# Patient Record
Sex: MALE | Race: BLACK OR AFRICAN AMERICAN | Employment: FULL TIME | URBAN - METROPOLITAN AREA
[De-identification: names, ages, dates, MRNs, and addresses within clinical notes are randomized per-mention and may not be internally consistent; named-entity substitution may affect disease eponyms.]

---

## 2017-03-20 PROBLEM — J30.89 ENVIRONMENTAL AND SEASONAL ALLERGIES: Status: ACTIVE | Noted: 2017-03-20

## 2017-04-03 PROBLEM — R22.0 SWELLING OF LEFT SIDE OF FACE: Status: ACTIVE | Noted: 2017-04-03

## 2017-04-07 ENCOUNTER — HOSPITAL ENCOUNTER (OUTPATIENT)
Dept: ULTRASOUND IMAGING | Age: 47
Discharge: HOME | End: 2017-04-07
Attending: FAMILY MEDICINE
Payer: COMMERCIAL

## 2017-04-07 DIAGNOSIS — R60.9 SWELLING: ICD-10-CM

## 2017-04-07 PROCEDURE — 76536 US EXAM OF HEAD AND NECK: CPT

## 2017-04-18 PROBLEM — R94.5 ABNORMAL LIVER FUNCTION: Status: ACTIVE | Noted: 2017-04-18

## 2017-04-24 NOTE — PROGRESS NOTES
No subcutaneous masses demonstrated-this was discussed with the patient at his follow-up appointment

## 2017-04-26 PROBLEM — J34.1: Status: ACTIVE | Noted: 2017-04-26

## 2017-04-26 PROBLEM — J34.1 MAXILLARY SINUS CYST: Status: ACTIVE | Noted: 2017-04-26

## 2017-11-16 PROBLEM — E78.2 MIXED HYPERLIPIDEMIA: Status: ACTIVE | Noted: 2017-11-16

## 2017-11-16 PROBLEM — D45 POLYCYTHEMIA VERA (HCC): Status: RESOLVED | Noted: 2017-11-16 | Resolved: 2017-11-16

## 2017-11-16 PROBLEM — D45 POLYCYTHEMIA VERA (HCC): Status: ACTIVE | Noted: 2017-11-16

## 2018-05-15 PROBLEM — E66.01 SEVERE OBESITY (BMI 35.0-39.9) WITH COMORBIDITY (HCC): Status: RESOLVED | Noted: 2018-05-15 | Resolved: 2018-05-15

## 2018-05-15 PROBLEM — E66.01 SEVERE OBESITY (BMI 35.0-39.9) WITH COMORBIDITY (HCC): Status: ACTIVE | Noted: 2018-05-15

## 2018-06-19 PROBLEM — Z80.0 FAMILY HISTORY OF COLON CANCER: Status: ACTIVE | Noted: 2018-06-19

## 2018-06-19 PROBLEM — Z12.11 COLON CANCER SCREENING: Status: ACTIVE | Noted: 2018-06-19

## 2018-06-19 PROBLEM — Z12.5 PROSTATE CANCER SCREENING: Status: ACTIVE | Noted: 2018-06-19

## 2018-07-17 PROBLEM — E66.01 SEVERE OBESITY (BMI 35.0-39.9): Status: ACTIVE | Noted: 2018-07-17

## 2018-07-17 PROBLEM — Z83.71 FH: COLON POLYPS: Status: ACTIVE | Noted: 2018-07-17

## 2018-11-07 ENCOUNTER — ANESTHESIA EVENT (OUTPATIENT)
Dept: ENDOSCOPY | Age: 48
End: 2018-11-07
Payer: COMMERCIAL

## 2018-11-07 RX ORDER — SODIUM CHLORIDE 0.9 % (FLUSH) 0.9 %
5-10 SYRINGE (ML) INJECTION AS NEEDED
Status: CANCELLED | OUTPATIENT
Start: 2018-11-07

## 2018-11-07 RX ORDER — SODIUM CHLORIDE, SODIUM LACTATE, POTASSIUM CHLORIDE, CALCIUM CHLORIDE 600; 310; 30; 20 MG/100ML; MG/100ML; MG/100ML; MG/100ML
100 INJECTION, SOLUTION INTRAVENOUS CONTINUOUS
Status: CANCELLED | OUTPATIENT
Start: 2018-11-07

## 2018-11-07 NOTE — H&P
PETR [address] [facility], [address] [facility], [facility] [address] [facility] 
[phone] H&P Note Pieter Tyler   MRN: [id number]     : 1970 HPI: Pieter Tyler is a 51 y.o. male who is referred by Dr. Osiel Snow for initial colonoscopy. The patient is of above average risk for colon cancer. Patient has a positive family history of colon cancer in his paternal grandfather who was diagnosed in his [de-identified]. He has uncles on both sides of the family that have had colon polyps. He has several brothers that have colon polyps since their 45s. He believes that his father also has had colon polyps. He has no GI complaints. He has normal daily to 2 times a day bowel movements. He denies any blood, mucus or change in caliber of stools. He denies any prior abdominal or perianal surgeries. He has never had any therapeutic radiation. Past Medical History:  
Diagnosis Date  Dyslipidemia 2014  Family history of prostate cancer 2014  Fracture of right wrist 1979  
 HTN (hypertension) 2014  CLAIRE (obstructive sleep apnea) 2014 No past surgical history on file. No current facility-administered medications for this encounter. Current Outpatient Medications Medication Sig  
 naproxen (NAPROSYN) 500 mg tablet Take 1 Tab by mouth two (2) times daily (with meals).  predniSONE (DELTASONE) 20 mg tablet Take 1 Tab by mouth daily (with breakfast).  atorvastatin (LIPITOR) 10 mg tablet Take 1 Tab by mouth daily.  benazepril (LOTENSIN) 20 mg tablet TAKE 1 TAB BY MOUTH DAILY.  fluticasone (FLONASE) 50 mcg/actuation nasal spray 2 Sprays by Both Nostrils route daily.  fexofenadine (ALLEGRA) 180 mg tablet Take 1 Tab by mouth nightly.  EPINEPHrine (EPIPEN) 0.3 mg/0.3 mL injection 0.3 mL by IntraMUSCular route once as needed for up to 1 dose. ALLERGIES:  Patient has no known allergies. Social History Socioeconomic History  Marital status: SINGLE Spouse name: Not on file  Number of children: 0  
 Years of education: Not on file  Highest education level: Not on file Social Needs  Financial resource strain: Not on file  Food insecurity - worry: Not on file  Food insecurity - inability: Not on file  Transportation needs - medical: Not on file  Transportation needs - non-medical: Not on file Occupational History  Occupation:  Tobacco Use  Smoking status: Never Smoker  Smokeless tobacco: Never Used Substance and Sexual Activity  Alcohol use: No  
 Drug use: No  
 Sexual activity: Not Currently Partners: Female Other Topics Concern  Not on file Social History Narrative  Not on file Social History Tobacco Use Smoking Status Never Smoker Smokeless Tobacco Never Used Family History Problem Relation Age of Onset  Cancer Maternal Uncle 72  
     prostate cancer  Cancer Paternal Grandfather   
     colon cancer  Diabetes Other  Hypertension Other  Prostate Cancer Paternal Uncle   
     in 2 uncles ROS: The patient has no difficulty with chest pain or shortness of breath. No fever or chills. The patient denies any personal or family history of abnormal clotting or bleeding. Comprehensive review of systems was otherwise unremarkable except as noted above. Physical Exam:  
Constitutional: Alert oriented cooperative patient in no acute distress. Visit Vitals  /82 (BP 1 Location: Right arm, BP Patient Position: Sitting)  Pulse 70  Resp 19  
 Ht 5' 8\" (1.727 m)  Wt 109.8 kg (242 lb 1.6 oz)  SpO2 97%  BMI 36.81 kg/m2 Eyes:Sclera are clear without icterus. ENMT: no obvious neck masses, no ear or lip lesions CV: RRR. Normal perfusion Resp: No JVD. Breathing is  non-labored. GI: obese, soft and non-distended Musculoskeletal: unremarkable with normal function. Neuro:  No obvious focal deficits Psychiatric: normal affect and mood, no memory impairment Lab Results Component Value Date/Time WBC 4.0 05/10/2017 08:27 AM  
 HGB 15.0 05/10/2017 08:27 AM  
 HCT 44.0 05/10/2017 08:27 AM  
 PLATELET 241 98/56/2434 08:27 AM  
 MCV 89 05/10/2017 08:27 AM  
 
 
Lab Results Component Value Date/Time Sodium 148 (H) 05/15/2018 08:43 AM  
 Potassium 4.1 05/15/2018 08:43 AM  
 Chloride 106 05/15/2018 08:43 AM  
 CO2 20 05/15/2018 08:43 AM  
 BUN 7 05/15/2018 08:43 AM  
 Creatinine 1.35 (H) 05/15/2018 08:43 AM  
 Glucose 110 (H) 05/15/2018 08:43 AM  
 Bilirubin, total 0.7 05/15/2018 08:43 AM  
 AST (SGOT) 37 05/15/2018 08:43 AM  
 Alk. phosphatase 57 05/15/2018 08:43 AM  
 
 
Assessment/Plan:     Pool Hsu is a 51 y.o. male who has family history of colon cancer and colon polyps. He has not had his initial colonoscopy yet. He has no current GI symptoms. We discussed proceeding with colonoscopy. He should do well with a 1 day prep. He should do well with the adult colonoscope. I discussed the patient's condition and treatment options with the patient. I discussed risks of colonoscopy in language the patient could understand including bleeding, infection, aspiration, perforation, medication reaction, need for further endoscopy or surgery, abscess, fistula, SBO, DVT, PE, heart attack, stroke, renal failure, respiratory failure, ventilatory dependence, and death. The patient voiced understanding of all this and all questions were answered. Alternatives to colonoscopy were discussed also and risks of the alternatives. The patient requested that we proceed with colonoscopy. Informed consent was obtained. Problem List  Date Reviewed: 9/5/2018 Codes Class Noted Severe obesity (BMI 35.0-39. 9) ICD-10-CM: E66.01 
ICD-9-CM: 278.01  7/17/2018 FH: colon polyps ICD-10-CM: Z83.71 ICD-9-CM: V18.51  7/17/2018 Prostate cancer screening ICD-10-CM: Z12.5 ICD-9-CM: V76.44  6/19/2018 Colon cancer screening ICD-10-CM: Z12.11 ICD-9-CM: V76.51  6/19/2018 Family history of colon cancer ICD-10-CM: Z80.0 ICD-9-CM: V16.0  6/19/2018 Mixed hyperlipidemia ICD-10-CM: E78.2 ICD-9-CM: 272.2  11/16/2017 BMI 36.0-36.9,adult ICD-10-CM: Q83.46 
ICD-9-CM: V85.36  5/24/2017 Maxillary sinus cyst ICD-10-CM: J34.1 ICD-9-CM: 478.19  4/26/2017 Cyst of left sphenoid sinus ICD-10-CM: J34.1 ICD-9-CM: 478.19  4/26/2017 Abnormal liver function ICD-10-CM: K76.89 
ICD-9-CM: 573.9  4/18/2017 Swelling of left side of face ICD-10-CM: R22.0 ICD-9-CM: 784.2  4/3/2017 Environmental and seasonal allergies ICD-10-CM: J30.89 ICD-9-CM: 477.8  3/20/2017 BMI 35.0-35.9,adult ICD-10-CM: D27.00 ICD-9-CM: V85.35  12/30/2016 Physical exam, annual ICD-10-CM: Z00.00 ICD-9-CM: V70.0  12/30/2016 Chronic kidney disease ICD-10-CM: N18.9 ICD-9-CM: 585.9  12/30/2016 Blurry vision, right eye ICD-10-CM: H53.8 ICD-9-CM: 368.8  12/30/2016 Obesity (BMI 30-39. 9) ICD-10-CM: E66.9 ICD-9-CM: 278.00  11/10/2016 BMI 34.0-34.9,adult ICD-10-CM: U37.51 
ICD-9-CM: V85.34  11/10/2016 Hypertension, essential, benign ICD-10-CM: I10 
ICD-9-CM: 401.1  7/28/2014 CLAIRE (obstructive sleep apnea) ICD-10-CM: G47.33 
ICD-9-CM: 327.23  5/8/2014 Family history of prostate cancer ICD-10-CM: Z80.42 
ICD-9-CM: V16.42  5/8/2014 Dyslipidemia ICD-10-CM: E78.5 ICD-9-CM: 272.4  5/8/2014 Amaya Palomo MD,  FACS

## 2018-11-08 ENCOUNTER — HOSPITAL ENCOUNTER (OUTPATIENT)
Age: 48
Setting detail: OUTPATIENT SURGERY
Discharge: HOME | End: 2018-11-08
Attending: SURGERY | Admitting: SURGERY
Payer: COMMERCIAL

## 2018-11-08 ENCOUNTER — ANESTHESIA (OUTPATIENT)
Dept: ENDOSCOPY | Age: 48
End: 2018-11-08
Payer: COMMERCIAL

## 2018-11-08 VITALS
WEIGHT: 240 LBS | RESPIRATION RATE: 20 BRPM | HEART RATE: 75 BPM | SYSTOLIC BLOOD PRESSURE: 143 MMHG | DIASTOLIC BLOOD PRESSURE: 80 MMHG | OXYGEN SATURATION: 99 % | BODY MASS INDEX: 36.37 KG/M2 | HEIGHT: 68 IN

## 2018-11-08 PROCEDURE — 88305 TISSUE EXAM BY PATHOLOGIST: CPT

## 2018-11-08 PROCEDURE — 74011250636 HC RX REV CODE- 250/636: Performed by: ANESTHESIOLOGY

## 2018-11-08 PROCEDURE — 76040000026: Performed by: SURGERY

## 2018-11-08 PROCEDURE — 74011250636 HC RX REV CODE- 250/636

## 2018-11-08 PROCEDURE — 76060000032 HC ANESTHESIA 0.5 TO 1 HR: Performed by: SURGERY

## 2018-11-08 PROCEDURE — 77030013991 HC SNR POLYP ENDOSC BSC -A: Performed by: SURGERY

## 2018-11-08 RX ORDER — PROPOFOL 10 MG/ML
INJECTION, EMULSION INTRAVENOUS AS NEEDED
Status: DISCONTINUED | OUTPATIENT
Start: 2018-11-08 | End: 2018-11-08 | Stop reason: HOSPADM

## 2018-11-08 RX ORDER — PROPOFOL 10 MG/ML
INJECTION, EMULSION INTRAVENOUS
Status: DISCONTINUED | OUTPATIENT
Start: 2018-11-08 | End: 2018-11-08 | Stop reason: HOSPADM

## 2018-11-08 RX ORDER — SODIUM CHLORIDE, SODIUM LACTATE, POTASSIUM CHLORIDE, CALCIUM CHLORIDE 600; 310; 30; 20 MG/100ML; MG/100ML; MG/100ML; MG/100ML
100 INJECTION, SOLUTION INTRAVENOUS CONTINUOUS
Status: DISCONTINUED | OUTPATIENT
Start: 2018-11-08 | End: 2018-11-08 | Stop reason: HOSPADM

## 2018-11-08 RX ORDER — LIDOCAINE HYDROCHLORIDE 20 MG/ML
INJECTION, SOLUTION EPIDURAL; INFILTRATION; INTRACAUDAL; PERINEURAL AS NEEDED
Status: DISCONTINUED | OUTPATIENT
Start: 2018-11-08 | End: 2018-11-08 | Stop reason: HOSPADM

## 2018-11-08 RX ADMIN — SODIUM CHLORIDE, SODIUM LACTATE, POTASSIUM CHLORIDE, AND CALCIUM CHLORIDE 100 ML/HR: 600; 310; 30; 20 INJECTION, SOLUTION INTRAVENOUS at 10:15

## 2018-11-08 RX ADMIN — PROPOFOL 200 MCG/KG/MIN: 10 INJECTION, EMULSION INTRAVENOUS at 10:39

## 2018-11-08 RX ADMIN — LIDOCAINE HYDROCHLORIDE 40 MG: 20 INJECTION, SOLUTION EPIDURAL; INFILTRATION; INTRACAUDAL; PERINEURAL at 10:39

## 2018-11-08 RX ADMIN — PROPOFOL 50 MG: 10 INJECTION, EMULSION INTRAVENOUS at 10:39

## 2018-11-08 NOTE — INTERVAL H&P NOTE
H&P Update: 
Windy Foot was seen and examined. History and physical has been reviewed. The patient has been examined.  There have been no significant clinical changes since the completion of the originally dated History and Physical. 
 
Signed By: Briseida Baires MD   
 November 8, 2018 10:27 AM

## 2018-11-08 NOTE — ANESTHESIA POSTPROCEDURE EVALUATION
Procedure(s): 
COLONOSCOPY / BMI=36 ENDOSCOPIC POLYPECTOMY. Anesthesia Post Evaluation Multimodal analgesia: multimodal analgesia used between 6 hours prior to anesthesia start to PACU discharge Patient location during evaluation: PACU Patient participation: complete - patient participated Level of consciousness: responsive to verbal stimuli Pain management: adequate Airway patency: patent Anesthetic complications: no 
Cardiovascular status: acceptable Respiratory status: spontaneous ventilation and nonlabored ventilation Hydration status: acceptable Comments: No Nausea Visit Vitals /76 Pulse 86 Resp 20 Ht 5' 8\" (1.727 m) Wt 108.9 kg (240 lb) SpO2 97% BMI 36.49 kg/m²

## 2018-11-08 NOTE — ANESTHESIA PREPROCEDURE EVALUATION
Anesthetic History No history of anesthetic complications Review of Systems / Medical History Patient summary reviewed, nursing notes reviewed and pertinent labs reviewed Pulmonary Sleep apnea: CPAP Neuro/Psych Within defined limits Cardiovascular Hypertension Hyperlipidemia Exercise tolerance: >4 METS 
  
GI/Hepatic/Renal 
Within defined limits Endo/Other Obesity Other Findings Physical Exam 
 
Airway Mallampati: II 
 
 
Mouth opening: Normal 
 
 Cardiovascular Regular rate and rhythm,  S1 and S2 normal,  no murmur, click, rub, or gallop Dental 
No notable dental hx Pulmonary Breath sounds clear to auscultation Abdominal 
 
 
 
 Other Findings Anesthetic Plan ASA: 2 Anesthesia type: total IV anesthesia Induction: Intravenous Anesthetic plan and risks discussed with: Patient Discussed TIVA with its benefits (lower risk of nausea and sore throat, etc.) and risks including possible awareness, patient understands and elects to proceed

## 2018-11-08 NOTE — DISCHARGE INSTRUCTIONS
Gastrointestinal Colonoscopy/Flexible Sigmoidoscopy - Lower Exam Discharge Instructions  1. Call Dr. Melissa Downing at [phone] for any problems or questions. 2. Contact the doctors office for follow up appointment as directed  3. Medication may cause drowsiness for several hours, therefore, do not drive or operate machinery for remainder of the day. 4. No alcohol today. 5. Ordinarily, you may resume regular diet and activity after exam unless otherwise specified by your physician. 6. Because of air put into your colon during exam, you may experience some abdominal distension, relieved by the passage of gas, for several hours. 7. Contact your physician if you have any of the following:  a. Excessive amount of bleeding - large amount when having a bowel movement. Occasional specks of blood with bowel movement would not be unusual.  b. Severe abdominal pain  c. Fever or Chills  8. Polyp Removal - follow these additional instructions  a. No Aspirin, Advil, Aleve, Nuprin, Ibuprofen, or medications that contain these drugs for 2 weeks. Any additional instructions:   Follow up with pathology results, follow with doctor in two weeks, need two day prep next colonoscopy, recommend to repeat colonoscopy in one year,

## 2018-11-12 NOTE — PROCEDURES
[address] [facility]  [phone]    Colonoscopy Procedure Note    Name: Olita Bloch     Date: 11/8/2018  Med Record Number: [id number]   Age: 51 y.o. Sex: male   Procedure: Colonoscopy with polypectomy (cold snare), polypectomy (snare cautery)  Pre-operative Diagnosis:  Increased risk colon screen, +FH colon cancer, +FH colon polyps  Post-operative Diagnosis: colon polyps x 3 (cecum, ascending colon, hepatic flexure), incomplete prep (splenic flexure and descending colon 30% not well visualized)  Indications: family history of colon cancer and polyps, screening for colon cancer  Anesthesia/Sedation: MAC IV MAC anesthesia Propofol  Procedure Details:    Informed consent was obtained for the procedure, including sedation. Risks of perforation, hemorrhage, adverse drug reaction and aspiration were discussed. The patient was placed in the left lateral decubitus position. Based on the pre-procedure assessment, including review of the patient's medical history, medications, allergies, and review of systems, he had been deemed to be an appropriate candidate for sedation by the Anesthesia Dept. The patient was monitored continuously with ECG tracing, pulse oximetry, blood pressure monitoring, and direct observations. A time out was performed. Once sedation was adequate, a rectal examination was performed. The [id number] was inserted into the rectum and advanced under direct vision to the cecum, which was identified by the ileocecal valve and appendiceal orifice. The quality of the colonic preparation was suboptimal with dependent side of the splenic flexure and descending colon with 30% of the lumen not visualized due to vegetative retained stool. .  A careful inspection was made as the colonoscope was withdrawn, including a retroflexed view of the rectum; findings and interventions are described below.   Appropriate photodocumentation was obtained. Findings: ANUS: Anal exam reveals no masses or hemorrhoids, sphincter tone is normal.   RECTUM: Rectal exam reveals no masses or hemorrhoids. SIGMOID COLON: The mucosa is normal with good vascular pattern and without ulcers, diverticula, and polyps. DESCENDING COLON: The mucosa is normal with good vascular pattern and without ulcers, diverticula, and polyps. 30% of the proximal dependent lumen was not visualized due to retained vegetative fecal material  SPLENIC FLEXURE: The splenic flexure is normal.  30% of the dependent lumen was not visualized due to retained vegetative fecal material  TRANSVERSE COLON: The mucosa is normal with good vascular pattern and without ulcers, diverticula, and polyps. HEPATIC FLEXURE: The hepatic flexure shows:  - Protruding lesions:  -Pedunculated Polyp size Patient~10 mm removed by polypectomy (snare cautery)  ASCENDING COLON: The mucosa is normal with good vascular pattern and without ulcers and diverticula. - Protruding lesions: -Pedunculated Polyp(distal R colon) size Patient~10 mm removed by polypectomy (cold snare)   CECUM: The appendiceal orifice appears normal. The ileocecal valve appears normal.   - Protruding lesions: -Pedunculated Polyp size 5 mm removed by polypectomy (snare cautery)  TERMINAL ILEUM: The terminal ileum was not entered. Specimens: cecal polyp, R colon polyp, hepatic flexure polyp    Estimated Blood Loss:  0-minimum           Complications: None; patient tolerated the procedure well. Attending Attestation: I performed the procedure. Impression:  See post-procedure diagnoses    Recommendations:-Await pathology. , -Follow up with me., -Post polypectomy precautions. Repeat colonoscopy in 6-12 months pending pathology review. Discussed status of incomplete prep and recommend 2-day prep at next colonoscopy.     Hector Tracey MD, FACS

## 2018-11-19 PROBLEM — T63.441A BEE STING REACTION: Status: ACTIVE | Noted: 2018-11-19

## 2018-12-07 PROBLEM — D12.6 ADENOMATOUS POLYP OF COLON: Status: ACTIVE | Noted: 2018-12-07

## 2018-12-07 PROBLEM — D12.2 ADENOMATOUS POLYP OF ASCENDING COLON: Status: ACTIVE | Noted: 2018-12-07

## 2019-05-01 ENCOUNTER — ANESTHESIA EVENT (OUTPATIENT)
Dept: ENDOSCOPY | Age: 49
End: 2019-05-01
Payer: COMMERCIAL

## 2019-05-01 NOTE — H&P
PETR [facility]  
[address] [facility], [address] [facility], [address] [facility] 
[phone] H&P Note Aicha Sims   MRN: [id number]     : 1970 HPI: Aicha Sims is a 47 y.o. male who returns to the office following his initial screening colonoscopy. He was high risk screening due to family history. He had significant findings. He had 3 significant polyps removed from the ascending colon. One was in the cecum, one was in the distal ascending colon and one was in the hepatic flexure. All 3 of these polyps were adenomas. Greater than 10 mm polyp in the distal ascending colon was a tubulovillous adenoma. The other 2 polyps were tubular adenomas. Patient completed a 1 day prep as directed. Unfortunately this did not completely clear out his colon for complete visualization of the left side of his colon. From the splenic flexure to the mid descending colon,  30% about lumen cannot be cleared. Given his significant histology, I am recommending repeat colonoscopy in 6 months. We can have a short interval follow-up of the polyps that were removed as well as to clear the remaining portion of the unseen colon. We will recommend a 2-day prep. He otherwise tolerated the colonoscopy procedure well. I gave him copy of his pathology report and explained the findings. He was referred by Dr. Paulina Reid for initial colonoscopy. The patient is of above average risk for colon cancer. Patient has a positive family history of colon cancer in his paternal grandfather who was diagnosed in his [de-identified]. He has uncles on both sides of the family that have had colon polyps. He has several brothers that have colon polyps since their 45s. He believes that his father also has had colon polyps. He has no GI complaints. He has normal daily to 2 times a day bowel movements. He denies any blood, mucus or change in caliber of stools.   He denies any prior abdominal or perianal surgeries. He has never had any therapeutic radiation. Past Medical History:  
Diagnosis Date  Dyslipidemia 5/8/2014  Family history of prostate cancer 5/8/2014  Fracture of right wrist 1979  
 HTN (hypertension) 5/8/2014  CLAIRE (obstructive sleep apnea) 5/8/2014  
 cpap Past Surgical History:  
Procedure Laterality Date  COLONOSCOPY N/A 11/8/2018 COLONOSCOPY / BMI=36 performed by Madonna Greer MD at [facility] ENDOSCOPY  COLONOSCOPY,ELOISE MEADOWS,SNARE  11/12/2018 No current facility-administered medications for this encounter. Current Outpatient Medications Medication Sig  
 atorvastatin (LIPITOR) 10 mg tablet Take 1 Tab by mouth daily. (Patient taking differently: Take 10 mg by mouth nightly.)  benazepril (LOTENSIN) 20 mg tablet TAKE 1 TAB BY MOUTH DAILY. ALLERGIES:  Bee sting [sting, bee] Social History Socioeconomic History  Marital status: SINGLE Spouse name: Not on file  Number of children: 0  
 Years of education: Not on file  Highest education level: Not on file Occupational History  Occupation:  Tobacco Use  Smoking status: Never Smoker  Smokeless tobacco: Never Used Substance and Sexual Activity  Alcohol use: No  
 Drug use: No  
 Sexual activity: Not Currently Partners: Female Social History Tobacco Use Smoking Status Never Smoker Smokeless Tobacco Never Used Family History Problem Relation Age of Onset  Cancer Maternal Uncle 72  
     prostate cancer  Cancer Paternal Grandfather   
     colon cancer  Diabetes Other  Hypertension Other  Prostate Cancer Paternal Uncle   
     in 2 uncles  Hypertension Mother  Diabetes Father  Diabetes Sister  Hypertension Brother  Hypertension Sister ROS: The patient has no difficulty with chest pain or shortness of breath. No fever or chills. The patient denies any personal or family history of abnormal clotting or bleeding. Comprehensive review of systems was otherwise unremarkable except as noted above. Physical Exam:  
Constitutional: Alert oriented cooperative patient in no acute distress. Visit Vitals /80 (BP 1 Location: Right arm, BP Patient Position: Sitting) Pulse (!) 101 Resp 19 Ht 5' 8\" (1.727 m) Wt 248 lb 14.4 oz (112.9 kg) SpO2 97% BMI 37.85 kg/m² Eyes:Sclera are clear without icterus. ENMT: no obvious neck masses, no ear or lip lesions CV: RRR. Normal perfusion Resp: No JVD. Breathing is  non-labored. GI: obese, soft and non-distended Musculoskeletal: unremarkable with normal function. Neuro:  No obvious focal deficits Psychiatric: normal affect and mood, no memory impairment Lab Results Component Value Date/Time WBC 4.3 11/19/2018 08:47 AM  
 HGB 16.4 11/19/2018 08:47 AM  
 HCT 48.7 11/19/2018 08:47 AM  
 PLATELET 618 20/76/4171 08:47 AM  
 MCV 86 11/19/2018 08:47 AM  
 
 
Lab Results Component Value Date/Time Sodium 145 (H) 11/19/2018 08:47 AM  
 Potassium 3.6 11/19/2018 08:47 AM  
 Chloride 105 11/19/2018 08:47 AM  
 CO2 24 11/19/2018 08:47 AM  
 BUN 7 11/19/2018 08:47 AM  
 Creatinine 1.31 (H) 11/19/2018 08:47 AM  
 Glucose 102 (H) 11/19/2018 08:47 AM  
 Bilirubin, total 0.5 11/19/2018 08:47 AM  
 AST (SGOT) 46 (H) 11/19/2018 08:47 AM  
 Alk. phosphatase 59 11/19/2018 08:47 AM  
 
 
Assessment/Plan:     Eliu Figueroa is a 47 y.o. male who has significant findings on his initial colonoscopy. He had 3 adenomatous polyps on the right colon. He had incomplete visualization of the left colon and will require 2-day prep for next colonoscopy. We discussed short interval colonoscopy in 6 months. . We fashioned a 2-day prep  He should do well with the adult colonoscope.   I discussed the patient's condition and treatment options with the patient. I discussed risks of colonoscopy in language the patient could understand including bleeding, infection, aspiration, perforation, medication reaction, need for further endoscopy or surgery, abscess, fistula, SBO, DVT, PE, heart attack, stroke, renal failure, respiratory failure, ventilatory dependence, and death. The patient voiced understanding of all this and all questions were answered. Alternatives to colonoscopy were discussed also and risks of the alternatives. The patient requested that we proceed with colonoscopy. Informed consent was obtained. He understands that if his colonoscopy is negative at this next session then we will recommend an interval surveillance of 3 years. Problem List  Date Reviewed: 12/7/2018 Codes Class Noted Adenomatous polyp of ascending colon ICD-10-CM: D12.2 ICD-9-CM: 211.3  12/7/2018 Overview Signed 12/7/2018  8:47 AM by Koby Flanagan MD  
  Initial colonoscopy 11/8/2018 DIAGNOSIS  
A: CECAL POLYP: FRAGMENTS OF TUBULAR ADENOMA. B: ASCENDING COLON POLYP: FRAGMENTS OF MIXED TUBULOVILLOUS ADENOMA. C: HEPATIC FLEXURE POLYP: FRAGMENT OF TUBULAR ADENOMA. Electronically signed out on 11/9/2018 10:16 by ROGER Landis M.D. Bee sting reaction ICD-10-CM: K55.051K ICD-9-CM: 989.5, E905.3  11/19/2018 Severe obesity (BMI 35.0-39. 9) ICD-10-CM: E66.01 
ICD-9-CM: 278.01  7/17/2018 FH: colon polyps ICD-10-CM: Z83.71 ICD-9-CM: V18.51  7/17/2018 Prostate cancer screening ICD-10-CM: Z12.5 ICD-9-CM: V76.44  6/19/2018 Colon cancer screening ICD-10-CM: Z12.11 ICD-9-CM: V76.51  6/19/2018 Family history of colon cancer ICD-10-CM: Z80.0 ICD-9-CM: V16.0  6/19/2018 Mixed hyperlipidemia ICD-10-CM: E78.2 ICD-9-CM: 272.2  11/16/2017 BMI 36.0-36.9,adult ICD-10-CM: X83.05 
ICD-9-CM: V85.36  5/24/2017 Maxillary sinus cyst ICD-10-CM: J34.1 ICD-9-CM: 478.19  4/26/2017 Cyst of left sphenoid sinus ICD-10-CM: J34.1 ICD-9-CM: 478.19  4/26/2017 Abnormal liver function ICD-10-CM: R94.5 ICD-9-CM: 794.8  4/18/2017 Swelling of left side of face ICD-10-CM: R22.0 ICD-9-CM: 784.2  4/3/2017 Environmental and seasonal allergies ICD-10-CM: J30.89 ICD-9-CM: 477.8  3/20/2017 BMI 35.0-35.9,adult ICD-10-CM: V69.26 ICD-9-CM: V85.35  12/30/2016 Physical exam, annual ICD-10-CM: Z00.00 ICD-9-CM: V70.0  12/30/2016 Chronic kidney disease ICD-10-CM: N18.9 ICD-9-CM: 585.9  12/30/2016 Blurry vision, right eye ICD-10-CM: H53.8 ICD-9-CM: 368.8  12/30/2016 Obesity (BMI 30-39. 9) ICD-10-CM: E66.9 ICD-9-CM: 278.00  11/10/2016 BMI 34.0-34.9,adult ICD-10-CM: I61.21 
ICD-9-CM: V85.34  11/10/2016 Hypertension, essential, benign ICD-10-CM: I10 
ICD-9-CM: 401.1  7/28/2014 CLAIRE (obstructive sleep apnea) ICD-10-CM: G47.33 
ICD-9-CM: 327.23  5/8/2014 Family history of prostate cancer ICD-10-CM: Z80.42 
ICD-9-CM: V16.42  5/8/2014 Dyslipidemia ICD-10-CM: E78.5 ICD-9-CM: 272.4  5/8/2014 Christine Montilla MD,  FACS

## 2019-05-02 ENCOUNTER — HOSPITAL ENCOUNTER (OUTPATIENT)
Age: 49
Setting detail: OUTPATIENT SURGERY
Discharge: HOME | End: 2019-05-02
Attending: SURGERY | Admitting: SURGERY
Payer: COMMERCIAL

## 2019-05-02 ENCOUNTER — ANESTHESIA (OUTPATIENT)
Dept: ENDOSCOPY | Age: 49
End: 2019-05-02
Payer: COMMERCIAL

## 2019-05-02 VITALS
DIASTOLIC BLOOD PRESSURE: 86 MMHG | HEIGHT: 68 IN | WEIGHT: 235 LBS | SYSTOLIC BLOOD PRESSURE: 125 MMHG | OXYGEN SATURATION: 97 % | RESPIRATION RATE: 16 BRPM | HEART RATE: 74 BPM | BODY MASS INDEX: 35.61 KG/M2 | TEMPERATURE: 96.8 F

## 2019-05-02 PROCEDURE — 76060000032 HC ANESTHESIA 0.5 TO 1 HR: Performed by: SURGERY

## 2019-05-02 PROCEDURE — 74011000250 HC RX REV CODE- 250

## 2019-05-02 PROCEDURE — 88305 TISSUE EXAM BY PATHOLOGIST: CPT

## 2019-05-02 PROCEDURE — 77030013991 HC SNR POLYP ENDOSC BSC -A: Performed by: SURGERY

## 2019-05-02 PROCEDURE — 76040000026: Performed by: SURGERY

## 2019-05-02 PROCEDURE — 74011250636 HC RX REV CODE- 250/636

## 2019-05-02 PROCEDURE — 74011250636 HC RX REV CODE- 250/636: Performed by: ANESTHESIOLOGY

## 2019-05-02 RX ORDER — PROPOFOL 10 MG/ML
INJECTION, EMULSION INTRAVENOUS
Status: DISCONTINUED | OUTPATIENT
Start: 2019-05-02 | End: 2019-05-02 | Stop reason: HOSPADM

## 2019-05-02 RX ORDER — PROPOFOL 10 MG/ML
INJECTION, EMULSION INTRAVENOUS AS NEEDED
Status: DISCONTINUED | OUTPATIENT
Start: 2019-05-02 | End: 2019-05-02 | Stop reason: HOSPADM

## 2019-05-02 RX ORDER — EPHEDRINE SULFATE 50 MG/ML
INJECTION, SOLUTION INTRAVENOUS AS NEEDED
Status: DISCONTINUED | OUTPATIENT
Start: 2019-05-02 | End: 2019-05-02 | Stop reason: HOSPADM

## 2019-05-02 RX ORDER — SODIUM CHLORIDE, SODIUM LACTATE, POTASSIUM CHLORIDE, CALCIUM CHLORIDE 600; 310; 30; 20 MG/100ML; MG/100ML; MG/100ML; MG/100ML
100 INJECTION, SOLUTION INTRAVENOUS CONTINUOUS
Status: DISCONTINUED | OUTPATIENT
Start: 2019-05-02 | End: 2019-05-02 | Stop reason: HOSPADM

## 2019-05-02 RX ADMIN — EPHEDRINE SULFATE 5 MG: 50 INJECTION, SOLUTION INTRAVENOUS at 08:19

## 2019-05-02 RX ADMIN — PROPOFOL 20 MG: 10 INJECTION, EMULSION INTRAVENOUS at 08:07

## 2019-05-02 RX ADMIN — SODIUM CHLORIDE, SODIUM LACTATE, POTASSIUM CHLORIDE, AND CALCIUM CHLORIDE 100 ML/HR: 600; 310; 30; 20 INJECTION, SOLUTION INTRAVENOUS at 07:27

## 2019-05-02 RX ADMIN — PROPOFOL 30 MG: 10 INJECTION, EMULSION INTRAVENOUS at 08:08

## 2019-05-02 RX ADMIN — PROPOFOL 180 MCG/KG/MIN: 10 INJECTION, EMULSION INTRAVENOUS at 08:07

## 2019-05-02 RX ADMIN — SODIUM CHLORIDE, SODIUM LACTATE, POTASSIUM CHLORIDE, AND CALCIUM CHLORIDE 100 ML/HR: 600; 310; 30; 20 INJECTION, SOLUTION INTRAVENOUS at 07:48

## 2019-05-02 NOTE — PROCEDURES
[address] [facility]  [phone]    Colonoscopy Procedure Note    Name: Sabrina Freeman     Date: 5/2/2019  Med Record Number: [id number]   Age: 47 y.o. Sex: male   Procedure: Colonoscopy with polypectomy (cold snare), polypectomy (snare cautery)  Pre-operative Diagnosis:  H/o adenomatous polyps ascending colon w incomplete prep L colon (6 mos prior)  Post-operative Diagnosis: sigmoid colon polyp, mid rectal polyp, tortuous colon  Indications: previous adenomatous polyp, incomplete prep L side of colon  Anesthesia/Sedation: MAC IV MAC anesthesia Propofol  Procedure Details:    Informed consent was obtained for the procedure, including sedation. Risks of perforation, hemorrhage, adverse drug reaction and aspiration were discussed. The patient was placed in the left lateral decubitus position. Based on the pre-procedure assessment, including review of the patient's medical history, medications, allergies, and review of systems, he had been deemed to be an appropriate candidate for sedation by the Anesthesia Dept. The patient was monitored continuously with ECG tracing, pulse oximetry, blood pressure monitoring, and direct observations. A time out was performed. Once sedation was adequate, a rectal examination was performed. The [id number] was inserted into the rectum and advanced under direct vision to the cecum, which was identified by the ileocecal valve and appendiceal orifice. The quality of the 2 day colonic preparation was excellent. A careful inspection was made as the colonoscope was withdrawn, including a retroflexed view of the rectum; findings and interventions are described below. Appropriate photodocumentation was obtained.     Findings: ANUS: Anal exam reveals no masses or hemorrhoids, sphincter tone is normal.   RECTUM: Rectal exam reveals no masses or hemorrhoids.   - Protruding lesions:  -Pedunculated Polyp size 5 mm removed by polypectomy (snare cautery) located on proximal side of 2nd rectal valve. SIGMOID COLON: The mucosa is normal with good vascular pattern and without ulcers and diverticula. - Protruding lesions:  -Pedunculated Polyp size 3 mm removed by polypectomy (cold snare)  DESCENDING COLON: The mucosa is normal with good vascular pattern and without ulcers, diverticula, and polyps. SPLENIC FLEXURE: The splenic flexure is normal.   TRANSVERSE COLON: The mucosa is normal with good vascular pattern and without ulcers, diverticula, and polyps. HEPATIC FLEXURE: The hepatic flexure is normal.   ASCENDING COLON: The mucosa is normal with good vascular pattern and without ulcers, diverticula, and polyps. CECUM: The appendiceal orifice appears normal. The ileocecal valve appears normal.   TERMINAL ILEUM: The terminal ileum was not entered. Specimens: sigmoid polyp, rectal polyp    Estimated Blood Loss:  0-minimum           Complications: None; patient tolerated the procedure well. Attending Attestation: I performed the procedure. Impression:  See post-procedure diagnoses    Recommendations:-Await pathology. , -Repeat colonoscopy in 3 years (2022) based on initial colonoscopy with TV adenomas. , -Follow up with me., -post polypectomy precautions.     Christine Montilla MD, FACS

## 2019-05-02 NOTE — INTERVAL H&P NOTE
H&P Update: 
Diana Gutiérrez was seen and examined. History and physical has been reviewed. The patient has been examined.  There have been no significant clinical changes since the completion of the originally dated History and Physical.

## 2019-05-02 NOTE — ANESTHESIA PREPROCEDURE EVALUATION
Anesthetic History No history of anesthetic complications Review of Systems / Medical History Patient summary reviewed, nursing notes reviewed and pertinent labs reviewed Pulmonary Sleep apnea: CPAP Neuro/Psych Within defined limits Cardiovascular Hypertension: well controlled Hyperlipidemia Exercise tolerance: >4 METS 
  
GI/Hepatic/Renal 
  
 
 
Renal disease: CRI Endo/Other Obesity Other Findings Physical Exam 
 
Airway Mallampati: II 
TM Distance: 4 - 6 cm Neck ROM: normal range of motion Mouth opening: Normal 
 
 Cardiovascular Regular rate and rhythm,  S1 and S2 normal,  no murmur, click, rub, or gallop Dental 
No notable dental hx Pulmonary Breath sounds clear to auscultation Abdominal 
 
 
 
 Other Findings Anesthetic Plan ASA: 3 Anesthesia type: total IV anesthesia Induction: Intravenous Anesthetic plan and risks discussed with: Patient Discussed TIVA with its benefits (lower risk of nausea and sore throat, etc.) and risks including possible awareness, patient understands and elects to proceed

## 2019-05-02 NOTE — ROUTINE PROCESS
VSS at discharge. No complaints noted. Education reviewed and signed with patient and family. Pt discharged via wheelchair by Rocío Urban, [address] [place] [address]. Patient to be driven home by sister.

## 2019-05-02 NOTE — DISCHARGE INSTRUCTIONS
Dr. Jocelin Barrett  [phone]    Instructions following colonoscopy:    ACTIVITY:   Resume usual, basic activities around the house today.  You may be light-headed or sleepy from anesthesia, so be careful going up and down stairs.  Avoid driving, operating machinery, or signing documents for 24 hours. DIET:   No restriction. Please note, some people may have nausea or cramps after this procedure which can result in an upset stomach after eating.  Many people have loose stools or diarrhea immediately after colonoscopy. It is also not uncommon to not have a bowel movement for 2-3 days. PAIN:   Some cramping or gas pain is normal after colonoscopy. However, if you experience worsening pain over the course of the day, or pain with associated fever please call the office immediately      [address] IF:   You have a temperature higher than 101.5° Fahrenheit for more than 6 hours.  You have severe nausea or vomiting not relieved by medication; or diarrhea. Continue home medications as previously prescribed.     Call the office and make appointment for two weeks to discuss results

## 2019-05-02 NOTE — ANESTHESIA POSTPROCEDURE EVALUATION
Procedure(s): 
COLONOSCOPY/ 37 
ENDOSCOPIC POLYPECTOMY. total IV anesthesia Anesthesia Post Evaluation Multimodal analgesia: multimodal analgesia used between 6 hours prior to anesthesia start to PACU discharge Patient location during evaluation: bedside Patient participation: complete - patient participated Level of consciousness: awake and alert Pain score: 3 Pain management: adequate Airway patency: patent Anesthetic complications: no 
Cardiovascular status: acceptable and hemodynamically stable Respiratory status: acceptable Hydration status: acceptable Post anesthesia nausea and vomiting:  none Vitals Value Taken Time /70 5/2/2019  8:47 AM  
Temp 36 °C (96.8 °F) 5/2/2019  8:47 AM  
Pulse 86 5/2/2019  8:53 AM  
Resp 18 5/2/2019  8:47 AM  
SpO2 96 % 5/2/2019  8:53 AM  
Vitals shown include unvalidated device data.

## 2020-06-11 PROBLEM — Z23 ENCOUNTER FOR IMMUNIZATION: Status: ACTIVE | Noted: 2020-06-11

## 2020-06-11 PROBLEM — J30.1 ALLERGIC RHINITIS DUE TO POLLEN: Status: ACTIVE | Noted: 2020-06-11

## 2020-07-11 PROBLEM — Z23 ENCOUNTER FOR IMMUNIZATION: Status: RESOLVED | Noted: 2020-06-11 | Resolved: 2020-07-11

## 2021-01-10 PROBLEM — Z23 ENCOUNTER FOR IMMUNIZATION: Status: RESOLVED | Noted: 2020-06-11 | Resolved: 2021-01-10

## 2021-01-15 ENCOUNTER — HOSPITAL ENCOUNTER (OUTPATIENT)
Dept: MRI IMAGING | Age: 51
Discharge: HOME | End: 2021-01-15
Attending: UROLOGY
Payer: COMMERCIAL

## 2021-01-15 DIAGNOSIS — R97.20 ELEVATED PSA: ICD-10-CM

## 2021-01-15 PROCEDURE — 74011250636 HC RX REV CODE- 250/636: Performed by: UROLOGY

## 2021-01-15 PROCEDURE — A9575 INJ GADOTERATE MEGLUMI 0.1ML: HCPCS | Performed by: UROLOGY

## 2021-01-15 PROCEDURE — 72197 MRI PELVIS W/O & W/DYE: CPT

## 2021-01-15 RX ORDER — GADOTERATE MEGLUMINE 376.9 MG/ML
22 INJECTION INTRAVENOUS
Status: COMPLETED | OUTPATIENT
Start: 2021-01-15 | End: 2021-01-15

## 2021-01-15 RX ORDER — SODIUM CHLORIDE 0.9 % (FLUSH) 0.9 %
10 SYRINGE (ML) INJECTION
Status: COMPLETED | OUTPATIENT
Start: 2021-01-15 | End: 2021-01-15

## 2021-01-15 RX ADMIN — Medication 10 ML: at 17:30

## 2021-01-15 RX ADMIN — GADOTERATE MEGLUMINE 22 ML: 376.9 INJECTION INTRAVENOUS at 17:30

## 2022-02-11 ENCOUNTER — HOSPITAL ENCOUNTER (OUTPATIENT)
Dept: LAB | Age: 52
Discharge: HOME | End: 2022-02-11
Payer: COMMERCIAL

## 2022-02-11 DIAGNOSIS — R97.20 ELEVATED PSA: ICD-10-CM

## 2022-02-11 LAB — PSA SERPL-MCNC: 4.1 NG/ML

## 2022-02-11 PROCEDURE — 36415 COLL VENOUS BLD VENIPUNCTURE: CPT

## 2022-02-11 PROCEDURE — 84153 ASSAY OF PSA TOTAL: CPT

## 2022-03-18 PROBLEM — Z12.5 PROSTATE CANCER SCREENING: Status: ACTIVE | Noted: 2018-06-19

## 2022-03-18 PROBLEM — Z80.0 FAMILY HISTORY OF COLON CANCER: Status: ACTIVE | Noted: 2018-06-19

## 2022-03-18 PROBLEM — R22.0 SWELLING OF LEFT SIDE OF FACE: Status: ACTIVE | Noted: 2017-04-03

## 2022-03-19 PROBLEM — J34.1 MAXILLARY SINUS CYST: Status: ACTIVE | Noted: 2017-04-26

## 2022-03-19 PROBLEM — J30.1 ALLERGIC RHINITIS DUE TO POLLEN: Status: ACTIVE | Noted: 2020-06-11

## 2022-03-19 PROBLEM — J34.1: Status: ACTIVE | Noted: 2017-04-26

## 2022-03-19 PROBLEM — Z83.71 FH: COLON POLYPS: Status: ACTIVE | Noted: 2018-07-17

## 2022-03-19 PROBLEM — R94.5 ABNORMAL LIVER FUNCTION: Status: ACTIVE | Noted: 2017-04-18

## 2022-03-19 PROBLEM — E66.01 SEVERE OBESITY WITH BODY MASS INDEX (BMI) OF 35.0 TO 39.9 WITH SERIOUS COMORBIDITY (HCC): Status: ACTIVE | Noted: 2018-07-17

## 2022-03-19 PROBLEM — J30.89 ENVIRONMENTAL AND SEASONAL ALLERGIES: Status: ACTIVE | Noted: 2017-03-20

## 2022-03-19 PROBLEM — Z83.719 FH: COLON POLYPS: Status: ACTIVE | Noted: 2018-07-17

## 2022-03-19 PROBLEM — T63.441A BEE STING REACTION: Status: ACTIVE | Noted: 2018-11-19

## 2022-03-20 PROBLEM — E78.2 MIXED HYPERLIPIDEMIA: Status: ACTIVE | Noted: 2017-11-16

## 2022-03-20 PROBLEM — Z12.11 COLON CANCER SCREENING: Status: ACTIVE | Noted: 2018-06-19

## 2022-03-20 PROBLEM — D12.2 ADENOMATOUS POLYP OF ASCENDING COLON: Status: ACTIVE | Noted: 2018-12-07

## 2022-06-15 ENCOUNTER — OFFICE VISIT (OUTPATIENT)
Dept: FAMILY MEDICINE CLINIC | Facility: CLINIC | Age: 52
End: 2022-06-15
Payer: COMMERCIAL

## 2022-06-15 VITALS
TEMPERATURE: 97.2 F | OXYGEN SATURATION: 98 % | RESPIRATION RATE: 16 BRPM | HEART RATE: 71 BPM | WEIGHT: 251 LBS | DIASTOLIC BLOOD PRESSURE: 84 MMHG | BODY MASS INDEX: 38.04 KG/M2 | HEIGHT: 68 IN | SYSTOLIC BLOOD PRESSURE: 120 MMHG

## 2022-06-15 DIAGNOSIS — E78.2 MIXED HYPERLIPIDEMIA: Primary | ICD-10-CM

## 2022-06-15 DIAGNOSIS — E66.9 OBESITY (BMI 30-39.9): ICD-10-CM

## 2022-06-15 DIAGNOSIS — J30.1 NON-SEASONAL ALLERGIC RHINITIS DUE TO POLLEN: ICD-10-CM

## 2022-06-15 DIAGNOSIS — I10 HYPERTENSION, ESSENTIAL, BENIGN: ICD-10-CM

## 2022-06-15 LAB
ALBUMIN SERPL-MCNC: 4.1 G/DL (ref 3.5–5)
ALBUMIN/GLOB SERPL: 1.3 {RATIO} (ref 1.2–3.5)
ALP SERPL-CCNC: 66 U/L (ref 50–136)
ALT SERPL-CCNC: 44 U/L (ref 12–65)
ANION GAP SERPL CALC-SCNC: 7 MMOL/L (ref 7–16)
AST SERPL-CCNC: 26 U/L (ref 15–37)
BASOPHILS # BLD: 0.1 K/UL (ref 0–0.2)
BASOPHILS NFR BLD: 2 % (ref 0–2)
BILIRUB SERPL-MCNC: 0.7 MG/DL (ref 0.2–1.1)
BUN SERPL-MCNC: 7 MG/DL (ref 6–23)
CALCIUM SERPL-MCNC: 9.2 MG/DL (ref 8.3–10.4)
CHLORIDE SERPL-SCNC: 105 MMOL/L (ref 98–107)
CHOLEST SERPL-MCNC: 116 MG/DL
CO2 SERPL-SCNC: 30 MMOL/L (ref 21–32)
CREAT SERPL-MCNC: 1.3 MG/DL (ref 0.8–1.5)
DIFFERENTIAL METHOD BLD: ABNORMAL
EOSINOPHIL # BLD: 0.1 K/UL (ref 0–0.8)
EOSINOPHIL NFR BLD: 2 % (ref 0.5–7.8)
ERYTHROCYTE [DISTWIDTH] IN BLOOD BY AUTOMATED COUNT: 12.7 % (ref 11.9–14.6)
GLOBULIN SER CALC-MCNC: 3.1 G/DL (ref 2.3–3.5)
GLUCOSE SERPL-MCNC: 84 MG/DL (ref 65–100)
HCT VFR BLD AUTO: 49 % (ref 41.1–50.3)
HDLC SERPL-MCNC: 49 MG/DL (ref 40–60)
HDLC SERPL: 2.4 {RATIO}
HGB BLD-MCNC: 16.3 G/DL (ref 13.6–17.2)
IMM GRANULOCYTES # BLD AUTO: 0 K/UL (ref 0–0.5)
IMM GRANULOCYTES NFR BLD AUTO: 0 % (ref 0–5)
LDLC SERPL CALC-MCNC: 57 MG/DL
LYMPHOCYTES # BLD: 2 K/UL (ref 0.5–4.6)
LYMPHOCYTES NFR BLD: 39 % (ref 13–44)
MCH RBC QN AUTO: 28.4 PG (ref 26.1–32.9)
MCHC RBC AUTO-ENTMCNC: 33.3 G/DL (ref 31.4–35)
MCV RBC AUTO: 85.5 FL (ref 79.6–97.8)
MONOCYTES # BLD: 0.3 K/UL (ref 0.1–1.3)
MONOCYTES NFR BLD: 6 % (ref 4–12)
NEUTS SEG # BLD: 2.6 K/UL (ref 1.7–8.2)
NEUTS SEG NFR BLD: 51 % (ref 43–78)
NRBC # BLD: 0 K/UL (ref 0–0.2)
PLATELET # BLD AUTO: 219 K/UL (ref 150–450)
PMV BLD AUTO: 12 FL (ref 9.4–12.3)
POTASSIUM SERPL-SCNC: 3.7 MMOL/L (ref 3.5–5.1)
PROT SERPL-MCNC: 7.2 G/DL (ref 6.3–8.2)
RBC # BLD AUTO: 5.73 M/UL (ref 4.23–5.6)
SODIUM SERPL-SCNC: 142 MMOL/L (ref 138–145)
TRIGL SERPL-MCNC: 50 MG/DL (ref 35–150)
VLDLC SERPL CALC-MCNC: 10 MG/DL (ref 6–23)
WBC # BLD AUTO: 5 K/UL (ref 4.3–11.1)

## 2022-06-15 PROCEDURE — 99214 OFFICE O/P EST MOD 30 MIN: CPT | Performed by: FAMILY MEDICINE

## 2022-06-15 RX ORDER — ATORVASTATIN CALCIUM 10 MG/1
10 TABLET, FILM COATED ORAL
Qty: 90 TABLET | Refills: 1 | Status: SHIPPED | OUTPATIENT
Start: 2022-06-15

## 2022-06-15 RX ORDER — BENAZEPRIL HYDROCHLORIDE 20 MG/1
20 TABLET ORAL DAILY
Qty: 90 TABLET | Refills: 1 | Status: SHIPPED | OUTPATIENT
Start: 2022-06-15

## 2022-06-15 RX ORDER — FLUTICASONE PROPIONATE 50 MCG
2 SPRAY, SUSPENSION (ML) NASAL DAILY
Qty: 16 G | Refills: 2 | Status: SHIPPED | OUTPATIENT
Start: 2022-06-15

## 2022-06-15 RX ORDER — CETIRIZINE HYDROCHLORIDE 10 MG/1
10 TABLET ORAL DAILY
Qty: 90 TABLET | Refills: 1 | Status: SHIPPED | OUTPATIENT
Start: 2022-06-15

## 2022-06-15 ASSESSMENT — PATIENT HEALTH QUESTIONNAIRE - PHQ9
SUM OF ALL RESPONSES TO PHQ QUESTIONS 1-9: 0
SUM OF ALL RESPONSES TO PHQ QUESTIONS 1-9: 0
SUM OF ALL RESPONSES TO PHQ9 QUESTIONS 1 & 2: 0
SUM OF ALL RESPONSES TO PHQ QUESTIONS 1-9: 0
SUM OF ALL RESPONSES TO PHQ QUESTIONS 1-9: 0
2. FEELING DOWN, DEPRESSED OR HOPELESS: 0
1. LITTLE INTEREST OR PLEASURE IN DOING THINGS: 0

## 2022-06-15 ASSESSMENT — ENCOUNTER SYMPTOMS
VOMITING: 0
NAUSEA: 0
DIARRHEA: 0
EYE PAIN: 0
COUGH: 0
SHORTNESS OF BREATH: 0
WHEEZING: 0
SORE THROAT: 0
ABDOMINAL PAIN: 0
EYE REDNESS: 0
SINUS PAIN: 0
VOICE CHANGE: 0

## 2022-06-15 NOTE — PROGRESS NOTES
Miguel Way (:  1970) is a 47 y.o. male,Established patient, here for evaluation of the following chief complaint(s):  Medication Refill (labs)         ASSESSMENT/PLAN:  1. Mixed hyperlipidemia  -     atorvastatin (LIPITOR) 10 MG tablet; Take 1 tablet by mouth Daily with lunch, Disp-90 tablet, R-1Normal  -     Comprehensive Metabolic Panel; Future  -     Lipid Panel; Future  2. Hypertension, essential, benign  -     benazepril (LOTENSIN) 20 MG tablet; Take 1 tablet by mouth daily TAKE 1 TAB BY MOUTH DAILY. , Disp-90 tablet, R-1Normal  -     CBC with Auto Differential; Future  -     Comprehensive Metabolic Panel; Future  3. Non-seasonal allergic rhinitis due to pollen  -     cetirizine (ZYRTEC) 10 MG tablet; Take 1 tablet by mouth daily, Disp-90 tablet, R-1Normal  -     fluticasone (FLONASE) 50 MCG/ACT nasal spray; 2 sprays by Nasal route daily, Disp-16 g, R-2Normal  4. Obesity (BMI 30-39.9)  5. BMI 38.0-38.9,adult    Await labs, to continue to avoid salt, strongly advised to work on a low fat diet. Doing well with using Zyrtec and Flonase prn, advised to wear a thick mask while cutting grass. Advised patient to lose weight. Also advised to exercise regularly, to eat healthy foods, and to control portion sizes. Return for after 22- physical; 6 mths- , medication refills, fasting labs or prn sooner. Subjective   SUBJECTIVE/OBJECTIVE:  HPI  Patient comes today for follow-up, is fasting for labs, denies any side effects from his medicines, denies any associated symptoms.     Hyperlipidemia, Obesity-  eats out 40% of the time and fixes his meals at other times; trying to be careful with avoiding fatty foods; eating snacks at work, cut back on candy, cannot eat dairy- develops gas. He is taking Atorvastatin 10 mg with dinner.  He exercises twice a week, has gained about 5 lbs in the last 6 months.      Hypertension- he is still eating fast foods but has cut back - tries to avoid salt in his diet, takes Benazepril 20mg in am, had an eye appointment in July 2021- no floaters in a while. He does not check his BP typically.     Seasonal allergies- h/o bee sting reaction- He has been using Flonase NS at HS prn, takes Zyrtec at HS prn; stopped Saline NS; has tried Allegra in the past. He typically has symptoms in the spring and with cutting grass. Denies symptoms today.  (He had a bad quick reaction to a bee sting with swelling on the right side of his neck in 2018- requests a refill for the Epipen.)     (Elevated PSA- patient established care with Urology, Dr Meghan Yap; had an MRI has a repeat PSA in Jan 2022 followed by an appointment 1 week later)     Review of Systems   Constitutional: Negative for chills and fever. HENT: Negative for congestion, ear pain, postnasal drip, sinus pain, sneezing, sore throat and voice change. Eyes: Negative for pain and redness. Respiratory: Negative for cough, shortness of breath and wheezing. Cardiovascular: Negative for chest pain, palpitations and leg swelling. Gastrointestinal: Negative for abdominal pain, diarrhea, nausea and vomiting. Neurological: Negative for headaches. Objective   Physical Exam  Vitals and nursing note reviewed. Constitutional:       General: He is not in acute distress. HENT:      Right Ear: Tympanic membrane and ear canal normal.      Left Ear: Tympanic membrane and ear canal normal.      Nose: Nose normal. No congestion. Mouth/Throat:      Mouth: Mucous membranes are moist.      Pharynx: Oropharynx is clear. Eyes:      General:         Right eye: No discharge. Left eye: No discharge. Conjunctiva/sclera: Conjunctivae normal.      Pupils: Pupils are equal, round, and reactive to light. Cardiovascular:      Rate and Rhythm: Normal rate and regular rhythm. Pulmonary:      Effort: Pulmonary effort is normal.      Breath sounds: Normal breath sounds.    Abdominal:      General: Bowel sounds are normal.      Palpations: Abdomen is soft. Tenderness: There is no abdominal tenderness. Musculoskeletal:      Cervical back: Neck supple. No tenderness. Right lower leg: No edema. Left lower leg: No edema. Neurological:      Mental Status: He is alert. An electronic signature was used to authenticate this note.     --Alpesh Escobar MD

## 2022-06-15 NOTE — PATIENT INSTRUCTIONS
Patient Education        Managing Your Allergies: Care Instructions  Your Care Instructions     Managing your allergies is an important part of staying healthy. Your doctor will help you find out what may be the cause of the allergies. Common causes ofsymptoms are house dust and dust mites, animal dander, mold, and pollen. As soon as you know what triggers your symptoms, try to avoid those things. This can help prevent allergy symptoms, asthma, and other health problems. Ask your doctor about allergy medicine or immunotherapy. These treatments mayhelp reduce or prevent allergy symptoms. Follow-up care is a key part of your treatment and safety. Be sure to make and go to all appointments, and call your doctor if you are having problems. It's also a good idea to know your test results and keep alist of the medicines you take. How can you care for yourself at home?  If you have been told by your doctor that dust or dust mites are causing your allergy, decrease the dust around your bed:  ? Wash sheets, pillowcases, and other bedding in hot water every week. ? Use dust-proof covers for pillows, duvets, and mattresses. Avoid plastic covers because they tear easily and do not \"breathe. \" Wash as instructed on the label. ? Do not use any blankets and pillows that you do not need. ? Use blankets that you can wash in your washing machine. ? Consider removing drapes and carpets, which attract and hold dust, from your bedroom.  If you are allergic to house dust and mites, do not use home humidifiers. Your doctor can suggest ways you can control dust and mites.  Look for signs of cockroaches. Cockroaches cause allergic reactions. Use cockroach baits to get rid of them. Then, clean your home well. Cockroaches like areas where grocery bags, newspapers, empty bottles, or cardboard boxes are stored. Do not keep these inside your home, and keep trash and food containers sealed.  Seal off any spots where cockroaches might enter your home.  If you are allergic to mold, get rid of furniture, rugs, and drapes that smell musty. Check for mold in the bathroom.  If you are allergic to outdoor pollen or mold spores, use air-conditioning. Change or clean all filters every month. Keep windows closed.  If you are allergic to pollen, stay inside when pollen counts are high. Use a vacuum  with a HEPA filter or a double-thickness filter at least two times each week.  Stay inside when air pollution is bad. Avoid paint fumes, perfumes, and other strong odors.  Avoid conditions that make your allergies worse. Stay away from smoke. Do not smoke or let anyone else smoke in your house. Do not use fireplaces or wood-burning stoves.  If you are allergic to your pets, change the air filter in your furnace every month. Use high-efficiency filters.  If you are allergic to pet dander, keep pets outside or out of your bedroom. Old carpet and cloth furniture can hold a lot of animal dander. You may need to replace them. When should you call for help? Give an epinephrine shot if:     You think you are having a severe allergic reaction. After giving an epinephrine shot call 911, even if you feel better. Call 911 if:     You have symptoms of a severe allergic reaction. These may include:  ? Sudden raised, red areas (hives) all over your body. ? Swelling of the throat, mouth, lips, or tongue. ? Trouble breathing. ? Passing out (losing consciousness). Or you may feel very lightheaded or suddenly feel weak, confused, or restless.      You have been given an epinephrine shot, even if you feel better. Call your doctor now or seek immediate medical care if:     You have symptoms of an allergic reaction, such as:  ? A rash or hives (raised, red areas on the skin). ? Itching. ? Swelling. ? Belly pain, nausea, or vomiting.    Watch closely for changes in your health, and be sure to contact your doctor if:     Your allergies get worse.      You need help controlling your allergies.      You have questions about allergy testing.      You do not get better as expected. Where can you learn more? Go to [email or web address] org and sign in to your [employer] account. Enter L249 in the [employer] box to learn more about \"Managing Your Allergies: Care Instructions. \"     If you do not have an account, please click on the \"Sign Up Now\" link. Current as of: February 10, 2021               Content Version: 13.2  © 3090-3716 [employer]wise, Incorporated. Care instructions adapted under license by [facility] ([facility] [place] [facility]). If you have questions about a medical condition or this instruction, always ask your healthcare professional. [address] any warranty or liability for your use of this information.

## 2022-08-18 DIAGNOSIS — R97.20 ELEVATED PROSTATE SPECIFIC ANTIGEN (PSA): Primary | ICD-10-CM

## 2022-08-18 NOTE — PROGRESS NOTES
significant lower urinary tract symptoms and no other complaints today. Past medical, family and social histories, as well as medications and allergies, were reviewed and updated in the medical record as appropriate. PMH:     Past Medical History:   Diagnosis Date    Dyslipidemia 5/8/2014    Family history of prostate cancer 5/8/2014    Fracture of right wrist 1979    HTN (hypertension) 5/8/2014    QUIRINO (obstructive sleep apnea) 5/8/2014    cpap       MEDs:     atorvastatin  benazepril  cetirizine  fluticasone     ALLERGIES:    Allergies   Allergen Reactions    Bee Venom Anaphylaxis       ROS:     Review of Systems   Constitutional: Negative. Negative for chills, fatigue and fever. Respiratory: Negative. Cardiovascular: Negative. Gastrointestinal: Negative. Genitourinary: Negative. Negative for difficulty urinating, dysuria, flank pain, frequency, hematuria and urgency. Musculoskeletal: Negative. All other systems reviewed and are negative. PHYSICAL EXAMINATION    BP (!) 150/97 (Site: Right Upper Arm, Position: Sitting, Cuff Size: Large Adult)   Pulse 59   Temp 98 °F (36.7 °C) (Oral)   Resp 20   Ht 5' 8\" (1.727 m)   Wt 249 lb 8 oz (113.2 kg)   SpO2 97%   BMI 37.94 kg/m²   General: well dressed, well nourished, no acute distress  Skin: no rashes  HEENT: Sclera are clear,normocephalic, atraumatic. no external lesions   Cardiovascular: Reg. Normal perfusion  Respiratory: normal respiratory effort, no JVD, no audible wheezing. Musculoskeletal: unremarkable with normal function. No embolic signs or cyanosis.    Neurologic exam: intact, no focal deficits, moves all 4 extremities  Psych: normal mood and affect, alert, oriented x 3  LE:  no edema  GI: soft, nontender, no masses, no CVA tenderness  : DEFERRED       LABORATORY RESULTS:    Lab Results   Component Value Date/Time    PSA 3.6 08/19/2022 09:48 AM    PSA 4.1 02/11/2022 11:49 AM    PSA 3.4 07/30/2021 08:46 AM    PSA 3.4 02/05/2021 01:56 PM          IMAGING:    XR Results:    No results found for this or any previous visit. CT Results:    === 03/13/18 ===    CT MAXILLOFACIAL WO CONTRAST      MRI Results:    === 05/23/17 ===    MRI ORBIT FACE NECK W CONTRAST      ASSESSMENT:    Mr. Etelvina Miner is a 47 y.o. male with a diagnosis of Elevated PSA. His PSA is slightly down to 3.6. I am encouraged by that. He and I discussed again the potential for moving forward with prostate biopsy versus repeat MRI. Were both comfortable at this point with rechecking the PSA and PATTI in 6 months. It would then be about 2 years since his last MRI and we may repeat that. He is content holding off on biopsy for now. PLAN:   -psa today  -rtc in 6 months with psa prior and PATTI  -plan to discuss repeat MRI at next OV  ________________________________________      I have seen and examined this patient. I have reviewed and edited the note started by the MA and agree with the outlined plan. Part of this note was written by using a voice dictation software. The note has been proof read but may still contain some grammatical/other typographical errors.       [address] [facility]

## 2022-08-19 ENCOUNTER — OFFICE VISIT (OUTPATIENT)
Dept: ONCOLOGY | Age: 52
End: 2022-08-19
Payer: COMMERCIAL

## 2022-08-19 ENCOUNTER — HOSPITAL ENCOUNTER (OUTPATIENT)
Dept: LAB | Age: 52
Discharge: HOME | End: 2022-08-22
Payer: COMMERCIAL

## 2022-08-19 VITALS
DIASTOLIC BLOOD PRESSURE: 97 MMHG | HEART RATE: 59 BPM | SYSTOLIC BLOOD PRESSURE: 150 MMHG | TEMPERATURE: 98 F | HEIGHT: 68 IN | OXYGEN SATURATION: 97 % | RESPIRATION RATE: 20 BRPM | BODY MASS INDEX: 37.81 KG/M2 | WEIGHT: 249.5 LBS

## 2022-08-19 DIAGNOSIS — R97.20 ELEVATED PSA: Primary | ICD-10-CM

## 2022-08-19 DIAGNOSIS — R97.20 ELEVATED PROSTATE SPECIFIC ANTIGEN (PSA): ICD-10-CM

## 2022-08-19 LAB — PSA SERPL-MCNC: 3.6 NG/ML

## 2022-08-19 PROCEDURE — 99213 OFFICE O/P EST LOW 20 MIN: CPT | Performed by: UROLOGY

## 2022-08-19 PROCEDURE — 36415 COLL VENOUS BLD VENIPUNCTURE: CPT

## 2022-08-19 PROCEDURE — 84153 ASSAY OF PSA TOTAL: CPT

## 2022-08-19 ASSESSMENT — ENCOUNTER SYMPTOMS
GASTROINTESTINAL NEGATIVE: 1
RESPIRATORY NEGATIVE: 1

## 2022-09-01 ENCOUNTER — OFFICE VISIT (OUTPATIENT)
Dept: FAMILY MEDICINE CLINIC | Facility: CLINIC | Age: 52
End: 2022-09-01
Payer: COMMERCIAL

## 2022-09-01 VITALS
DIASTOLIC BLOOD PRESSURE: 98 MMHG | RESPIRATION RATE: 16 BRPM | TEMPERATURE: 96.8 F | BODY MASS INDEX: 37.89 KG/M2 | WEIGHT: 250 LBS | HEIGHT: 68 IN | HEART RATE: 71 BPM | SYSTOLIC BLOOD PRESSURE: 146 MMHG | OXYGEN SATURATION: 98 %

## 2022-09-01 DIAGNOSIS — Z11.59 ENCOUNTER FOR HEPATITIS C SCREENING TEST FOR LOW RISK PATIENT: ICD-10-CM

## 2022-09-01 DIAGNOSIS — G47.33 OSA (OBSTRUCTIVE SLEEP APNEA): ICD-10-CM

## 2022-09-01 DIAGNOSIS — Z00.00 PHYSICAL EXAM, ANNUAL: Primary | ICD-10-CM

## 2022-09-01 DIAGNOSIS — Z11.4 ENCOUNTER FOR SCREENING FOR HIV: ICD-10-CM

## 2022-09-01 PROCEDURE — 99396 PREV VISIT EST AGE 40-64: CPT | Performed by: FAMILY MEDICINE

## 2022-09-01 ASSESSMENT — PATIENT HEALTH QUESTIONNAIRE - PHQ9
SUM OF ALL RESPONSES TO PHQ QUESTIONS 1-9: 0
SUM OF ALL RESPONSES TO PHQ9 QUESTIONS 1 & 2: 0
SUM OF ALL RESPONSES TO PHQ QUESTIONS 1-9: 0
1. LITTLE INTEREST OR PLEASURE IN DOING THINGS: 0
SUM OF ALL RESPONSES TO PHQ QUESTIONS 1-9: 0
SUM OF ALL RESPONSES TO PHQ QUESTIONS 1-9: 0
2. FEELING DOWN, DEPRESSED OR HOPELESS: 0

## 2022-09-01 ASSESSMENT — ENCOUNTER SYMPTOMS
BLOOD IN STOOL: 0
ABDOMINAL PAIN: 0
SORE THROAT: 0
EYE PAIN: 0
VOMITING: 0
NAUSEA: 0
SINUS PAIN: 0
DIARRHEA: 0
COUGH: 0
PHOTOPHOBIA: 0
SHORTNESS OF BREATH: 0
BACK PAIN: 0
WHEEZING: 0

## 2022-09-01 NOTE — PROGRESS NOTES
2022    [employer] [employer] [address] (:  1970) is a 47 y.o. male, here for a preventive medicine evaluation. Patient comes today for a physical, last done on 2021; did fasting labs in 2022. Says his sister  from uterine cancer about 3 weeks ago, today he heard that his maternal Aunt . Patient says that 3 of his paternal Uncles have prostate cancer- 1 had it removed and the other 2 are being treated for it- 1 of them was diagnosed in his early 46s and the other was in his early 62s. Also one of his maternal Uncles had prostate cancer around age 72. Patient had a PSA done on 22- was 3.6, he sees a Urologist, Dr Ángel Burgess on 22- next appointment is in 6 months; PSA was elevated in the past then came down. He says that his paternal Jacklyn Turcios was diagnosed with colon cancer in his early [de-identified] and several Uncles and his brother have colon polyps. Patient had a colonoscopy on 2019 by Dr Rebecca Nguyen- had polyps taken out- to be repeated in 3 years; has another scheduled on 2023. His last TDaP vaccine was on 2020; Flu vaccine on 10/22/2021, took on 2020- that was the 1st time he took it; Shingrix vaccine on 2020 and 2020- had Chicken pox as a child; Covid vaccine on 3/26/21 and 2021, says he took 1 booster. His last dental cleaning was last week, goes every 6 months. He says his eye exam was on 22 at the [facility]- was within normal limits. HIV screening, Hepatitis C screening- patient denies any risk factors. Sleep apnea- was diagnosed about 10 years ago, not sure who he saw then. He was using a CPAP machine and needed a face mask with chin strap but stopped more than 5 years ago. Says he had cut back how often he was using it before stopping. Says he has lost a lot of weight which had helped his breathing, has now gained back some. He denies episodes of Apnea in a very long time.  He has not been sleeping well in the last 1 month due to the family stressors. Patient Active Problem List   Diagnosis    BMI 34.0-34.9,adult    BMI 36.0-36.9,adult    Family history of colon cancer    Family history of prostate cancer    Prostate cancer screening    Swelling of left side of face    Dyslipidemia    Environmental and seasonal allergies    BMI 35.0-35.9,adult    Allergic rhinitis due to pollen    Obesity (BMI 30-39. 9)    Bee sting reaction    FH: colon polyps    QUIRINO (obstructive sleep apnea)    Severe obesity with body mass index (BMI) of 35.0 to 39.9 with serious comorbidity (HCC)    Cyst of left sphenoid sinus    Abnormal liver function    Maxillary sinus cyst    Chronic kidney disease    Blurry vision, right eye    Hypertension, essential, benign    Mixed hyperlipidemia    Adenomatous polyp of ascending colon    Colon cancer screening    BMI 38.0-38.9,adult    Physical exam, annual       Review of Systems   Constitutional:  Negative for chills, fatigue and fever. HENT:  Negative for congestion, ear pain, postnasal drip, sinus pain, sneezing and sore throat. Eyes:  Negative for photophobia, pain and visual disturbance. Respiratory:  Negative for cough, shortness of breath and wheezing. Cardiovascular:  Negative for chest pain, palpitations and leg swelling. Gastrointestinal:  Negative for abdominal pain, blood in stool, diarrhea, nausea and vomiting. Endocrine: Negative for cold intolerance and heat intolerance. Genitourinary:  Negative for difficulty urinating, dysuria, flank pain, frequency, hematuria and urgency. Musculoskeletal:  Negative for arthralgias, back pain and myalgias. Skin:  Negative for pallor and rash. Neurological:  Negative for dizziness, weakness, light-headedness, numbness and headaches. Psychiatric/Behavioral:  Positive for sleep disturbance. Negative for agitation, dysphoric mood, self-injury and suicidal ideas. The patient is not nervous/anxious and is not hyperactive.       Prior to Visit Medications    Medication Sig Taking? Authorizing Provider   atorvastatin (LIPITOR) 10 MG tablet Take 1 tablet by mouth Daily with lunch Yes Wilder Sousa MD   cetirizine (ZYRTEC) 10 MG tablet Take 1 tablet by mouth daily Yes Wilder Sousa MD   fluticasone (FLONASE) 50 MCG/ACT nasal spray 2 sprays by Nasal route daily Yes Wilder Sousa MD   benazepril (LOTENSIN) 20 MG tablet Take 1 tablet by mouth daily TAKE 1 TAB BY MOUTH DAILY.  Yes Wilder Sousa MD        Allergies   Allergen Reactions    Bee Venom Anaphylaxis       Past Medical History:   Diagnosis Date    Dyslipidemia 5/8/2014    Family history of prostate cancer 5/8/2014    Fracture of right wrist 1979    HTN (hypertension) 5/8/2014    QUIRINO (obstructive sleep apnea) 5/8/2014    cpap       Past Surgical History:   Procedure Laterality Date    COLONOSCOPY N/A 5/2/2019    COLONOSCOPY/ 40 performed by Cate Peralta MD at [facility]    COLONOSCOPY N/A 11/8/2018    COLONOSCOPY / BMI=36 performed by Cate Peralta MD at [facility]       Social History     Socioeconomic History    Marital status: Single     Spouse name: Not on file    Number of children: 0    Years of education: Not on file    Highest education level: Not on file   Occupational History    Occupation:      Comment: [employer]   Tobacco Use    Smoking status: Never    Smokeless tobacco: Never   Vaping Use    Vaping Use: Never used   Substance and Sexual Activity    Alcohol use: No    Drug use: No    Sexual activity: Not Currently     Partners: Female   Other Topics Concern    Not on file   Social History Narrative    Not on file     Social Determinants of Health     Financial Resource Strain: Not on file   Food Insecurity: Not on file   Transportation Needs: Not on file   Physical Activity: Not on file   Stress: Not on file   Social Connections: Not on file   Intimate Partner Violence: Not on file   Housing Stability: Not on file        Family History   Problem Relation Age of Onset    Hypertension Mother         ESRD    Diabetes Father     Diabetes Sister         s/p amputation    Uterine Cancer Sister 64    Hypertension Sister     Hypertension Brother     Cancer Paternal Grandfather 80        colon cancer    Cancer Maternal Uncle 72        prostate cancer    Prostate Cancer Paternal Uncle         in 3 uncles       ADVANCE DIRECTIVE: N, <no information>    Vitals:    09/01/22 0811 09/01/22 0921   BP: (!) 150/100 (!) 146/98   Pulse: 71    Resp: 16    Temp: 96.8 °F (36 °C)    TempSrc: Temporal    SpO2: 98%    Weight: 250 lb (113.4 kg)    Height: 5' 8\" (1.727 m)      Estimated body mass index is 38.01 kg/m² as calculated from the following:    Height as of this encounter: 5' 8\" (1.727 m). Weight as of this encounter: 250 lb (113.4 kg). Physical Exam  Vitals and nursing note reviewed. Constitutional:       General: He is not in acute distress. Appearance: He is obese. HENT:      Head: Normocephalic and atraumatic. Right Ear: Tympanic membrane and ear canal normal.      Left Ear: Tympanic membrane and ear canal normal.      Nose: Nose normal. No congestion. Mouth/Throat:      Mouth: Mucous membranes are moist.      Pharynx: Oropharynx is clear. Eyes:      Extraocular Movements: Extraocular movements intact. Conjunctiva/sclera: Conjunctivae normal.      Pupils: Pupils are equal, round, and reactive to light. Neck:      Vascular: No carotid bruit. Comments: No thyromegaly  Cardiovascular:      Rate and Rhythm: Normal rate and regular rhythm. Pulmonary:      Effort: Pulmonary effort is normal. No respiratory distress. Breath sounds: Normal breath sounds. Abdominal:      General: Bowel sounds are normal. There is no distension. Palpations: Abdomen is soft. Tenderness: There is no right CVA tenderness or left CVA tenderness.    Genitourinary:     Comments: Patient denies any concerns, refuses exam  Musculoskeletal: Cervical back: Neck supple. No tenderness. Right lower leg: No edema. Left lower leg: No edema. Skin:     General: Skin is warm and dry. Neurological:      Mental Status: He is alert and oriented to person, place, and time. Mental status is at baseline. Motor: No weakness. Gait: Gait normal.      Deep Tendon Reflexes: Reflexes normal.   Psychiatric:      Comments: Slightly anxious       No flowsheet data found. Lab Results   Component Value Date/Time    CHOL 116 06/15/2022 11:38 AM    CHOL 129 12/10/2021 08:50 AM    CHOL 130 06/10/2021 11:17 AM    TRIG 50 06/15/2022 11:38 AM    TRIG 49 12/10/2021 08:50 AM    TRIG 39 06/10/2021 11:17 AM    HDL 49 06/15/2022 11:38 AM    HDL 46 12/10/2021 08:50 AM    HDL 44 06/10/2021 11:17 AM    LDLCALC 57 06/15/2022 11:38 AM    LDLCALC 72 12/10/2021 08:50 AM    LDLCALC 76 06/10/2021 11:17 AM    GLUCOSE 84 06/15/2022 11:38 AM       The ASCVD Risk score (Denny Wilks., et al., 2013) failed to calculate for the following reasons:     The valid total cholesterol range is 130 to 320 mg/dL    Immunization History   Administered Date(s) Administered    COVID-19, MODERNA BLUE border, Primary or Immunocompromised, (age 12y+), IM, 100 mcg/0.5mL 03/26/2021, 04/23/2021, 11/22/2021    DTaP (Infanrix) 07/16/1973, 08/21/1975    Influenza Virus Vaccine 08/29/2020    Influenza, FLUARIX, FLULAVAL, Colan Sethi (age 10 mo+) AND AFLURIA, (age 1 y+), PF, 0.5mL 10/22/2021    Polio Virus Vaccine 07/16/1973, 08/31/1973, 10/12/1973, 08/21/1975    Td (Adult), 5 Lf Tetanus Toxoid, Pf (Tenivac, Decavac) 01/03/2017    Td vaccine (adult) 03/08/1983, 01/03/2017    Tdap (Boostrix, Adacel) 07/25/2020, 12/11/2020    Zoster Recombinant (Shingrix) 06/12/2020, 08/29/2020       Health Maintenance   Topic Date Due    HIV screen  Never done    Hepatitis C screen  Never done    COVID-19 Vaccine (4 - Booster for Moderna series) 03/22/2022    Colorectal Cancer Screen  05/02/2022    Flu vaccine (1) 09/01/2022 Lipids  06/15/2023    Depression Screen  06/15/2023    DTaP/Tdap/Td vaccine (6 - Td or Tdap) 12/11/2030    Shingles vaccine  Completed    Hepatitis A vaccine  Aged Out    Hepatitis B vaccine  Aged Out    Hib vaccine  Aged Out    Meningococcal (ACWY) vaccine  Aged Out    Pneumococcal 0-64 years Vaccine  Aged Out       Assessment & Plan   Physical exam, annual  Encounter for screening for HIV  -     HIV 1/2 Ag/Ab, 4TH Generation,W Rflx Confirm; Future  Encounter for hepatitis C screening test for low risk patient  -     Hepatitis C Antibody; Future  QUIRINO (obstructive sleep apnea)  -     [facility] Vicente Dash MD, Sleep Medicine, [facility] to take the Flu vaccine in the fall, to take the Covid booster dose. To do HIV and Hep C Ab at next labs. Advised to contact his CPAP supplier and try to get a new machine and face mask, to restart using the current machine till then; referred to Sleep medicine. Advised to check BP and keep a log, to schedule an appointment with us in 1 week if BP are still elevated. Discussed when to go to the ER.     Return for Around 10th Dec- , medication refills, fasting labs or prn sooner; 1 yr- physical.         --Nestor Aschoff, MD

## 2022-09-15 ENCOUNTER — TELEPHONE (OUTPATIENT)
Dept: SLEEP MEDICINE | Age: 52
End: 2022-09-15

## 2022-09-15 NOTE — TELEPHONE ENCOUNTER
Patient is scheduled with Dr. Ester Leonard on 9/21/22 unfortunately he will be out of office at that time. Please reschedule.

## 2022-09-24 NOTE — PATIENT INSTRUCTIONS
Patient Instructions from Today's Visit    Reason for Visit:  Follow up     Diagnosis Information:  [email or web address]      Plan:  As long as your psa is stable, we will continue to monitor your labs. If it is elevated, we may need to discuss a repeat MRI     Follow Up: In about 6 months with labs prior     Recent Lab Results:  N/a    Treatment Summary has been discussed and given to patient: n/a        -------------------------------------------------------------------------------------------------------------------  Please call our office at [phone] if you have any  of the following symptoms:   Fever of 100.5 or greater  Chills  Shortness of breath  Swelling or pain in one leg    After office hours an answering service is available and will contact a provider for emergencies or if you are experiencing any of the above symptoms. Patient does express an interest in My Chart. My Chart log in information explained on the after visit summary printout at the [facility] [address] desk.     Claudene Cline, NCMA
Attending Attestation (For Attendings USE Only)...

## 2022-10-01 PROBLEM — Z00.00 PHYSICAL EXAM, ANNUAL: Status: RESOLVED | Noted: 2022-09-01 | Resolved: 2022-10-01

## 2022-10-18 NOTE — PROGRESS NOTES
[facility]  [address] [facility] , [address] [place] [address] Court, [address] [facility]  [phone]    Patient Name:  Chula Sommer  YOB: 1970      Office Visit 10/19/2022    CHIEF COMPLAINT:    Chief Complaint   Patient presents with    New Patient       HISTORY OF PRESENT ILLNESS:      The patient present in outpatient consultation at the request of Dr. Jhonatan Butst for evaluation and management of obstructive sleep apnea. The patient underwent a diagnostic polysomnography many years ago at [facility] sleep [place]. He was told he had severe sleep apnea and he was placed on CPAP therapy but he is not sure about the level of CPAP. He tried that for quite some time but had a lot of problem with his mask and supplies and he stopped using CPAP for a while and he started using it again but he has been off CPAP again at least for the last 3 or 4 years. He indicated that his symptoms including snoring, witnessed apneas are much worse at this time. He also noted he wake up with a dry mouth, have difficulty controlling his blood pressure over the last several months, inability to stay asleep easily. There is no history of cataplexy, hypnagogic hallucinations, or sleep paralysis. In addition, there is no history of frequent leg movements, kicking at night, or an inability to keep the legs still. He indicated he go to sleep around 12 midnight and wake up around 430 or 4:40 AM daily. He was grieving over the loss of his sister who  of metastatic uterine cancer and currently he is caring for his father who is in his [de-identified]. He has ongoing daytime fatigue especially with a short total sleep time he has been doing for many years. We discussed the importance of improving his sleep hygiene and reevaluating his sleep apnea status by doing another sleep study. He agreed to proceed with in lab split study to determine the level of sleep apnea he has currently and reinitiate his CPAP therapy.   We discussed the pathophysiology of sleep apnea and the importance of treating it especially regarding other comorbidities such as hypertension he currently has. The Adger score today was 9 out of 24. Sleepiness Scale:     Sitting and reading 1    Watching TV 2    Sitting inactive in a public place 1    As a passenger in a car for an hour without a break 1    Lying down to rest in the afternoon when circumstances Permits 3    Sitting and talking to someone 0    Sitting quietly after lunch without alcohol 1    In a car, while stopped for a few minutes 0    Total :  9/24    Past Medical History:   Diagnosis Date    Dyslipidemia 5/8/2014    Family history of prostate cancer 5/8/2014    Fracture of right wrist 1979    HTN (hypertension) 5/8/2014    QUIRINO (obstructive sleep apnea) 5/8/2014    cpap       Patient Active Problem List   Diagnosis    BMI 34.0-34.9,adult    BMI 36.0-36.9,adult    Family history of colon cancer    Family history of prostate cancer    Prostate cancer screening    Swelling of left side of face    Dyslipidemia    Environmental and seasonal allergies    BMI 35.0-35.9,adult    Allergic rhinitis due to pollen    Obesity (BMI 30-39. 9)    Bee sting reaction    FH: colon polyps    QUIRINO (obstructive sleep apnea)    Severe obesity with body mass index (BMI) of 35.0 to 39.9 with serious comorbidity ([address]    Cyst of left sphenoid sinus    Abnormal liver function    Maxillary sinus cyst    Chronic kidney disease    Blurry vision, right eye    Hypertension, essential, benign    Mixed hyperlipidemia    Adenomatous polyp of ascending colon    Colon cancer screening    BMI 38.0-38.9,adult    Non-restorative sleep    Poor sleep hygiene       Past Surgical History:   Procedure Laterality Date    COLONOSCOPY N/A 5/2/2019    COLONOSCOPY/ 37 performed by Vandana Bond MD at [facility]COPY    COLONOSCOPY N/A 11/8/2018    COLONOSCOPY / BMI=36 performed by Vandana Bond MD at [facility] END[facility]COPY [unfilled]    Social History     Socioeconomic History    Marital status: Single     Spouse name: Not on file    Number of children: 0    Years of education: Not on file    Highest education level: Not on file   Occupational History    Occupation:      Comment: [employer]   Tobacco Use    Smoking status: Never    Smokeless tobacco: Never   Vaping Use    Vaping Use: Never used   Substance and Sexual Activity    Alcohol use: No    Drug use: No    Sexual activity: Not Currently     Partners: Female   Other Topics Concern    Not on file   Social History Narrative    Not on file     Social Determinants of Health     Financial Resource Strain: Not on file   Food Insecurity: Not on file   Transportation Needs: Not on file   Physical Activity: Not on file   Stress: Not on file   Social Connections: Not on file   Intimate Partner Violence: Not on file   Housing Stability: Not on file         Family History   Problem Relation Age of Onset    Hypertension Mother         ESRD    Diabetes Father     Diabetes Sister         s/p amputation    Uterine Cancer Sister 64    Hypertension Sister     Hypertension Brother     Cancer Paternal Grandfather 80        colon cancer    Cancer Maternal Uncle 72        prostate cancer    Prostate Cancer Paternal Uncle         in 3 uncles         Allergies   Allergen Reactions    Bee Venom Anaphylaxis         Current Outpatient Medications   Medication Sig    Melatonin 5 MG CAPS Take 5 mg by mouth at bedtime    atorvastatin (LIPITOR) 10 MG tablet Take 1 tablet by mouth Daily with lunch    cetirizine (ZYRTEC) 10 MG tablet Take 1 tablet by mouth daily    fluticasone (FLONASE) 50 MCG/ACT nasal spray 2 sprays by Nasal route daily    benazepril (LOTENSIN) 20 MG tablet Take 1 tablet by mouth daily TAKE 1 TAB BY MOUTH DAILY. No current facility-administered medications for this visit.            REVIEW OF SYSTEMS:   CONSTITUTIONAL:   There is no history of fever, chills, night sweats, weight loss, weight gain, persistent fatigue, or lethargy/hypersomnolence. EYES:   Denies problems with eye pain, erythema, blurred vision, or visual field loss. ENTM:   Denies history of tinnitus, epistaxis, sore throat, hoarseness, or dysphonia. LYMPH:   Denies swollen glands. CARDIAC:   No chest pain, pressure, discomfort, palpitations, orthopnea, murmurs, or edema. GI:   No dysphagia, heartburn reflux, nausea/vomiting, diarrhea, abdominal pain, or bleeding. :   Denies history of dysuria, hematuria, polyuria, or decreased urine output. MS:   No history of myalgias, arthralgias, bone pain, or muscle cramps. SKIN:   No history of rashes, jaundice, cyanosis, nodules, or ulcers. ENDO:   Negative for heat or cold intolerance. No history of DM. PSYCH:   Negative for anxiety, depression, insomnia, hallucinations. NEURO:   There is no history of AMS, persistent headache, decreased level of consciousness, seizures, or motor or sensory deficits. PHYSICAL EXAM:    Vitals:    10/19/22 1557   BP: (!) 140/92   Pulse: 91   Resp: 14   Temp: 97.1 °F (36.2 °C)   SpO2: 99%        GENERAL APPEARANCE:   The patient is normal weight and in no respiratory distress. HEENT:   PERRL. Conjunctivae unremarkable. Nasal mucosa is without epistaxis, exudate, or polyps. Gums and dentition are unremarkable. There is moderately severe oropharyngeal narrowing. He is Mallampati 3   NECK/LYMPHATIC:   Symmetrical with no elevation of jugular venous pulsation. Trachea midline. No thyroid enlargement. No cervical adenopathy. LUNGS:   Normal respiratory effort with symmetrical lung expansion. Breath sounds are diminished in the bases. HEART:   There is a regular rate and rhythm. No murmur, rub, or gallop. There is no edema in the lower extremities. ABDOMEN:   Soft and non-tender. No hepatosplenomegaly.   Bowel sounds are normal.     SKIN:   There are no rashes, cyanosis, jaundice, or ecchymosis present. EXTREMITIES:   The extremities are unremarkable without clubbing, cyanosis, joint inflammation, degenerative, or ischemic change. MUSCULOSKELETAL:   There is no abnormal tone, muscle atrophy, or abnormal movement present. NEURO:   The patient is alert and oriented to person, place, and time. Memory appears intact and mood is normal.  No gross sensorimotor deficits are present. ASSESSMENT:  (Medical Decision Making)         ICD-10-CM    1. QUIRINO (obstructive sleep apnea), the severity of that is unknown. Reevaluation of his sleep apnea by split-night study is warranted at this time. The pathophysiology of obstructive sleep apnea was reviewed with the patient. It's potential to promote severe neurologic, cardiac, pulmonary, and gastrointestinal problems was discussed. Specifically, the increased incidence of hypertension, coronary artery disease, congestive heart failure, pulmonary hypertension, gastroesophageal reflux, pathologic hypersomnolence, memory loss, and glucose intolerance was related to the consequences of hypoxemia, hypercapnia, airway obstruction, and sympathetic overdrive. We also discussed the ability of nasal CPAP to correct these abnormalities through maintenance of a patent airway. Therapeutic options including surgery, oral appliances, and weight loss were also reviewed. G47.33 Ambulatory Referral to Sleep Studies      2. Severe obesity with body mass index (BMI) of 35.0 to 39.9 with serious comorbidity (HCC) ,     Likely contributing to sleep apnea E66.01       3. Non-restorative sleep, related to untreated sleep apnea and poor sleep hygiene G47.8 Ambulatory Referral to Sleep Studies         4. Poor sleep hygiene. This was discussed with him in detail and appropriate handout will be provided    PLAN:    The patient will be scheduled for urgent split-night study.   Once this is done he will be restarted on his CPAP with appropriate setting accordingly    Proper sleep hygiene and positional therapy are strongly recommended and discussed with him in detail    Appropriate handout regarding sleep hygiene and sleep education is given to the patient    Maintain his follow-up with the primary care physician    Encouraged the patient to maintain at least 6 to 7 hours of sleep consistently    He will be started on melatonin 5 mg nightly to assist with his sleep duration    He will return to sleep center in 4 months or sooner if needed. At that time we will determine his compliance and address any problem related to his CPAP treatment    Orders Placed This Encounter   Procedures    Ambulatory Referral to Sleep Studies     Referral Priority:   Urgent     Referral Type:   Consult for Advice and Opinion     Referral Reason:   Specialty Services Required     Number of Visits Requested:   1                 Orders Placed This Encounter   Medications    Melatonin 5 MG CAPS     Sig: Take 5 mg by mouth at bedtime     Dispense:  30 capsule     Refill:  5           Over 50% of today's office visit was spent in face to face time reviewing test results, prognosis, importance of compliance, education about disease process, benefits of medications, instructions for management of acute flare-ups, and follow up plans. Total face to face time spent with patient and charting was 45 minutes.     Ye Ivey MD  Electronically signed

## 2022-10-19 ENCOUNTER — OFFICE VISIT (OUTPATIENT)
Dept: SLEEP MEDICINE | Age: 52
End: 2022-10-19
Payer: COMMERCIAL

## 2022-10-19 VITALS
HEART RATE: 91 BPM | DIASTOLIC BLOOD PRESSURE: 92 MMHG | RESPIRATION RATE: 14 BRPM | HEIGHT: 68 IN | SYSTOLIC BLOOD PRESSURE: 140 MMHG | WEIGHT: 249 LBS | BODY MASS INDEX: 37.74 KG/M2 | OXYGEN SATURATION: 99 % | TEMPERATURE: 97.1 F

## 2022-10-19 DIAGNOSIS — Z72.821 POOR SLEEP HYGIENE: ICD-10-CM

## 2022-10-19 DIAGNOSIS — G47.33 OSA (OBSTRUCTIVE SLEEP APNEA): Primary | ICD-10-CM

## 2022-10-19 DIAGNOSIS — E66.01 SEVERE OBESITY WITH BODY MASS INDEX (BMI) OF 35.0 TO 39.9 WITH SERIOUS COMORBIDITY (HCC): ICD-10-CM

## 2022-10-19 DIAGNOSIS — G47.8 NON-RESTORATIVE SLEEP: ICD-10-CM

## 2022-10-19 PROCEDURE — 99204 OFFICE O/P NEW MOD 45 MIN: CPT | Performed by: INTERNAL MEDICINE

## 2022-10-19 NOTE — PATIENT INSTRUCTIONS
Sleep Hygiene Instructions    Sleep only as much as you need to feel refreshed during the following day. Restricting your time in bed helps to consolidate and deepen your sleep. Excessively long times in bed lead to fragmented and shallow sleep. Get up at your regular time the next day, no matter how little your slept. Get up at the same time each day, 7 days a week. A regular wake time in the morning leads to regular times on sleep onset, and helps to set your biological clock. Exercise regularly. Schedule exercise times so that they do not occur within 3 hours of when you intend to go to bed. Exercise makes it easier to initiate sleep and deepen sleep. Don't take your problems to bed. Plan some time earlier in the evening for working on your problems or planning the next day's activities. Worrying may interfere with initiating sleep and produce shallow sleep. Train yourself to use the bedroom only for sleep and sexual activity. This will help condition your brain to see bed as the place for sleeping. Do not read, watch TV or eat in bed. Do not try and fall asleep. This only makes the problem worse. Instead, turn on the light, leave the bedroom, and do something different like reading a book. Don't engage in stimulating activity. Return to bed only when you feel sleepy. Avoid long naps. Staying awake during the day helps to fall asleep at night. Naps totalling more than 30 minutes increase your chances of having trouble sleeping at night. Make sure that your bedroom is comfortable and free from light and noise. A comfortable, noise-free sleep environment will reduce the likelihood that you will wake up during the night. Noise that does not awaken you may disturb the quality of your sleep. Carpeting, insulated curtains, and closing the door may help. Make sure that your bedroom is at a comfortable temperature during the night.  Excessively warm or cold sleep environments may disturb sleep. Eat regular meals and di not go to bed hungry. Hunger may disturb sleep. A light snack at bedtime (especially carbohydrates) may help sleep, but avoid greasy or heavy foods. Avoid excessive liquids in the evening. Reducing liquid intake will minimize the need for night-time trips to the bathroom. Cut down on all caffeine products. Caffeinated beverages and food (Coffee, tea, cola, chocolate) can cause difficulty falling asleep, awakenings during the night, and shallow sleep. Even caffeine early in the day can disrupt night-time sleep. Avoid alcohol, especially in the evening. Although alcohol helps tense people fall asleep more easily, it causes awakenings later in the night. Smoking may disturb sleep. Nicotine is a stimulant. Try not to smoke during the night when you have trouble sleeping. Learning about Sleeping Well    What does sleeping well mean? Sleeping well means getting enough sleep. How much sleep is enough varies among people. The number of hours you sleep is not as improtant as how you feel when you wake up. If you do not feel refreshed, you probably need more sleep. Another sign of not getting enough sleep is feeling tired during the day. The average totally nightly sleep time is 7 1/2 to 8 hours. Healthy adults may need a little more or a little less than this. Why is getting enough sleep important? Getting enough quality sleep is a basic part of good health. When your sleep suffers, your mood and your thoughts can suffer too. Your thoughts can suffer too. You ma find yourself feeling more grumpy or stressed. No getting enough sleep also can lead to serious problems, including injury, accidents, anxiety, and depression. What might cause poor sleeping? Many things can cause sleep problems, including:    Stress. Stress can be caused by fear about a single event, such as giving a speech.   Or you may have ongoing stress, such as worry about work or school. Depression, anxiety, and other mental or emotional conditions. Changes in your sleep habits or surroundings. This includes changes that happen where you sleep, such as noise, light, or sleeping in a different bed. It also includes changes in your sleep pattern, such as having jet lab or working a late shift. Health problems, such as pain, breathing problems, and restless legs syndrome. Lack of regular exercise. How can you help yourself? Here are some tips that may help you sleep more soundly and wake up feeling more refreshed. Your sleeping area    Use your bedroom only for sleeping and sex. A bit of light reading may help you fall asleep. But if it doesn't, do your reading elsewhere in the house. Don't watch TV in bed. Be sure your bed is big enough to stretch out comfortable, especially if you have a sleep partner. Keep your bedroom quiet, dark, and cool. Use curtains, blinds, or sleep mask to block out light. To block out noise, use earplugs, soothing music, or a \"white noise\" machine. Your evening and bedtime routine    Create a relaxing bedtime routine. You might want to take a warm shower or bath, listen to soothing music, or drink a cup of non-caffeinated tea. Go to bed at the same time every night. And get up at the same time every morning, even if you feel tired. What to avoid:    Limit caffeine (coffee, tea, caffeinated sodas) during the day, and dont have any for at least 4 to 6 hours before bedtime. Don't drink alcohol before bedtime. Alcohol can cause you to wake up more often during the night. Don't smoke or use tobacco, especially in the evening. Nicotine can keep you awake. Don't like in bed away for too long. If you can't fall asleep, or if you wake up in the middle of the night and can't get back to sleep within 15 minutes or so, get out of bed and go to another room until you feel sleepy.     Don't take medicine right before bed that may keep you awake or make you feel hyper or enegerized. Your doctor can tell you if your medicine may do this and if you can take it earlier in the day. If you can't sleep:    Imagine yourself in a peaceful, pleasant scene. Focus on the details and feelings of being in a place that is relaxing. Get up and do a quiet or boring activity until you feel sleepy. Don't drink liquids after 6 p.m. if you wake up often because you have to go to the bathroom. Where can you learn more? Go to http://www.gray.com/    Enter C253 in the search box to learn more about \"Learning About Sleeping Well. \"           Learning About Sleeping Well  What does sleeping well mean? Sleeping well means getting enough sleep. How much sleep is enough varies among people. The number of hours you sleep is not as important as how you feel when you wake up. If you do not feel refreshed, you probably need more sleep. Another sign of not getting enough sleep is feeling tired during the day. The average total nightly sleep time is 7½ to 8 hours. Healthy adults may need a little more or a little less than this. Why is getting enough sleep important? Getting enough quality sleep is a basic part of good health. When your sleep suffers, your mood and your thoughts can suffer too. You may find yourself feeling more grumpy or stressed. Not getting enough sleep also can lead to serious problems, including injury, accidents, anxiety, and depression. What might cause poor sleeping? Many things can cause sleep problems, including:  Stress. Stress can be caused by fear about a single event, such as giving a speech. Or you may have ongoing stress, such as worry about work or school. Depression, anxiety, and other mental or emotional conditions. Changes in your sleep habits or surroundings. This includes changes that happen where you sleep, such as noise, light, or sleeping in a different bed.  It also includes changes in your sleep pattern, such as having jet lag or working a late shift. Health problems, such as pain, breathing problems, and restless legs syndrome. Lack of regular exercise. How can you help yourself? Here are some tips that may help you sleep more soundly and wake up feeling more refreshed. Your sleeping area   Use your bedroom only for sleeping and sex. A bit of light reading may help you fall asleep. But if it doesn't, do your reading elsewhere in the house. Don't watch TV in bed. Be sure your bed is big enough to stretch out comfortably, especially if you have a sleep partner. Keep your bedroom quiet, dark, and cool. Use curtains, blinds, or a sleep mask to block out light. To block out noise, use earplugs, soothing music, or a \"white noise\" machine. Your evening and bedtime routine   Get regular exercise, but not within 3 to 4 hours before your bedtime. Create a relaxing bedtime routine. You might want to take a warm shower or bath, listen to soothing music, or drink a cup of noncaffeinated tea. Go to bed at the same time every night. And get up at the same time every morning, even if you feel tired. What to avoid   Limit caffeine (coffee, tea, caffeinated sodas) during the day, and don't have any for at least 4 to 6 hours before bedtime. Don't drink alcohol before bedtime. Alcohol can cause you to wake up more often during the night. Don't smoke or use tobacco, especially in the evening. Nicotine can keep you awake. Don't take naps during the day, especially close to bedtime. Don't lie in bed awake for too long. If you can't fall asleep, or if you wake up in the middle of the night and can't get back to sleep within 15 minutes or so, get out of bed and go to another room until you feel sleepy. Don't take medicine right before bed that may keep you awake or make you feel hyper or energized.  Your doctor can tell you if your medicine may do this and if you can take it earlier in the day.  If you can't sleep   Imagine yourself in a peaceful, pleasant scene. Focus on the details and feelings of being in a place that is relaxing. Get up and do a quiet or boring activity until you feel sleepy. Don't drink any liquids after 6 p.m. if you wake up often because you have to go to the bathroom. Where can you learn more? Go to [employer]be  Enter W127 in the search box to learn more about \"Learning About Sleeping Well. \"   © 7846-0131 Healthwise, Incorporated. Care instructions adapted under license by [facility] (which disclaims liability or warranty for this information). This care instruction is for use with your licensed healthcare professional. If you have questions about a medical condition or this instruction, always ask your healthcare professional. [address] any warranty or liability for your use of this information. Content Version: 34.0.952731; Current as of: March 12, 2014              Sleep Apnea: After Your Visit  Your Care Instructions  Sleep apnea means that you frequently stop breathing for 10 seconds or longer during sleep. It can be mild to severe, based on the number of times an hour that you stop breathing or have slowed breathing. Blocked or narrowed airways in your nose, mouth, or throat can cause sleep apnea. Your airway can become blocked when your throat muscles and tongue relax during sleep. You can treat sleep apnea at home by making lifestyle changes. You also can use a CPAP breathing machine that keeps tissues in the throat from blocking your airway. Or your doctor may suggest that you use a breathing device while you sleep. It helps keep your airway open. This could be a device that you put in your mouth. Other examples include strips or disks that you use on your nose. In some cases, surgery may be needed to remove enlarged tissues in the throat. Follow-up care is a key part of your treatment and safety.  Be sure to make and go to all appointments, and call your doctor if you are having problems. It's also a good idea to know your test results and keep a list of the medicines you take. How can you care for yourself at home? Lose weight, if needed. It may reduce the number of times you stop breathing or have slowed breathing. Sleep on your side. It may stop mild apnea. If you tend to roll onto your back, sew a pocket in the back of your pajama top. Put a tennis ball into the pocket, and stitch the pocket shut. This will help keep you from sleeping on your back. Avoid alcohol and medicines such as sleeping pills and sedatives before bed. Do not smoke. Smoking can make sleep apnea worse. If you need help quitting, talk to your doctor about stop-smoking programs and medicines. These can increase your chances of quitting for good. Prop up the head of your bed 4 to 6 inches by putting bricks under the legs of the bed. Treat breathing problems, such as a stuffy nose, caused by a cold or allergies. Try a continuous positive airway pressure (CPAP) breathing machine if your doctor recommends it. The machine keeps your airway open when you sleep. If CPAP does not work for you, ask your doctor if you can try other breathing machines. A bilevel positive airway pressure machine uses one type of air pressure for breathing in and another type for breathing out. Another device raises or lowers air pressure as needed while you breathe. Talk to your doctor if:  Your nose feels dry or bleeds when you use one of these machines. You may need to increase moisture in the air. A humidifier may help. Your nose is runny or stuffy from using a breathing machine. Decongestants or a corticosteroid nasal spray may help. You are sleepy during the day and it gets in the way of the normal things you do. Do not drive when you are drowsy. When should you call for help?   Watch closely for changes in your health, and be sure to contact your doctor if: You still have sleep apnea even though you have made lifestyle changes. You are thinking of trying a device such as CPAP. You are having problems using a CPAP or similar machine. Where can you learn more? Go to [employer].be  Enter J936 in the search box to learn more about \"Sleep Apnea: After Your Visit. \"   © 0320-9709 Healthwise, Incorporated. Care instructions adapted under license by [facility] [employer] [facility] (which disclaims liability or warranty for this information). This care instruction is for use with your licensed healthcare professional. If you have questions about a medical condition or this instruction, always ask your healthcare professional. [address] any warranty or liability for your use of this information. Content Version: 06.3.769889; Current as of: January 14, 2014                        Eating Healthy Foods: After Your Visit  Your Care Instructions  Eating healthy foods can help lower your risk for disease. Healthy food gives you energy and keeps your heart strong, your brain active, your muscles working, and your bones strong. A healthy diet includes a variety of foods from the basic food groups: grains, vegetables, fruits, milk and milk products, and meat and beans. Some people may eat more of their favorite foods from only one food group and, as a result, miss getting the nutrients they need. So, it is important to pay attention not only to what you eat but also to what you are missing from your diet. You can eat a healthy, balanced diet by making a few small changes. Follow-up care is a key part of your treatment and safety. Be sure to make and go to all appointments, and call your doctor if you are having problems. Its also a good idea to know your test results and keep a list of the medicines you take. How can you care for yourself at home? Look at what you eat  Keep a food diary for a week or two and record everything you eat or drink. Track the number of servings you eat from each food group. For a balanced diet every day, eat a variety of:  6 or more ounce-equivalents of grains, such as cereals, breads, crackers, rice, or pasta, every day. An ounce-equivalent is 1 slice of bread, 1 cup of ready-to-eat cereal, or ½ cup of cooked rice, cooked pasta, or cooked cereal.  2½ cups of vegetables, especially:  Dark-green vegetables such as broccoli and spinach. Orange vegetables such as carrots and sweet potatoes. Dry beans (such as laboy and kidney beans) and peas (such as lentils). 2 cups of fresh, frozen, or canned fruit. A small apple or 1 banana or orange equals 1 cup.  3 cups of nonfat or low-fat milk, yogurt, or other milk products. 5½ ounces of meat and beans, such as chicken, fish, lean meat, beans, nuts, and seeds. One egg, 1 tablespoon of peanut butter, ½ ounce nuts or seeds, or ¼ cup of cooked beans equals 1 ounce of meat. Learn how to read food labels for serving sizes and ingredients. Fast-food and convenience-food meals often contain few or no fruits or vegetables. Make sure you eat some fruits and vegetables to make the meal more nutritious. Look at your food diary. For each food group, add up what you have eaten and then divide the total by the number of days. This will give you an idea of how much you are eating from each food group. See if you can find some ways to change your diet to make it more healthy. Start small  Do not try to make dramatic changes to your diet all at once. You might feel that you are missing out on your favorite foods and then be more likely to fail. Start slowly, and gradually change your habits. Try some of the following:  Use whole wheat bread instead of white bread. Use nonfat or low-fat milk instead of whole milk. Eat brown rice instead of white rice, and eat whole wheat pasta instead of white-flour pasta. Try low-fat cheeses and low-fat yogurt.   Add more fruits and vegetables to meals and have them for snacks. Add lettuce, tomato, cucumber, and onion to sandwiches. Add fruit to yogurt and cereal.  Enjoy food  You can still eat your favorite foods. You just may need to eat less of them. If your favorite foods are high in fat, salt, and sugar, limit how often you eat them, but do not cut them out entirely. Eat a wide variety of foods. Make healthy choices when eating out  The type of restaurant you choose can help you make healthy choices. Even fast-food chains are now offering more low-fat or healthier choices on the menu. Choose smaller portions, or take half of your meal home. When eating out, try:  A veggie pizza with a whole wheat crust or grilled chicken (instead of sausage or pepperoni). Pasta with roasted vegetables, grilled chicken, or marinara sauce instead of cream sauce. A vegetable wrap or grilled chicken wrap. Broiled or poached food instead of fried or breaded items. Make healthy choices easy  Buy packaged, prewashed, ready-to-eat fresh vegetables and fruits, such as baby carrots, salad mixes, and chopped or shredded broccoli and cauliflower. Buy packaged, presliced fruits, such as melon or pineapple. Choose 100% fruit or vegetable juice instead of soda. Limit juice intake to 4 to 6 oz (½ to ¾ cup) a day. Blend low-fat yogurt, fruit juice, and canned or frozen fruit to make a smoothie for breakfast or a snack. Where can you learn more? Go to [employer].be  Enter T751 in the search box to learn more about \"Eating Healthy Foods: After Your Visit. \"   © 9075-0192 Healthwise, Incorporated. Care instructions adapted under license by [facility] (which disclaims liability or warranty for this information).  This care instruction is for use with your licensed healthcare professional. If you have questions about a medical condition or this instruction, always ask your healthcare professional. [place] [address] any warranty or liability for your use of this information. Content Version: 10.1.301069; Current as of: May 17, 2013                                    Learning About Physical Activity  What is physical activity? Physical activity is any kind of activity that gets your body moving. Being active means allowing your body to \"practice\" breathing, stretching, and lifting. The more practice your body gets, the better it works. The types of physical activity that can help you get fit and stay healthy include:  Aerobic or \"cardio\" activities that make your heart beat faster and make you breathe harder, such as brisk walking, riding a bike, or running. Aerobic activities strengthen your heart and lungs and build up your endurance. Strength training activities that make your muscles work against, or \"resist,\" something, such as lifting weights or doing push-ups. These activities help tone and strengthen your muscles. Stretches that allow you to move your joints and muscles through their full range of motion. Stretching helps you be more flexible and avoid injury. What are the benefits of physical activity? Being active is one of the best things you can do to get fit and stay healthy. It helps you to:  Feel stronger and have more energy to do all the things you like to do. Focus better at school or work and perform better in sports. Feel, think, and sleep better. Reach and stay at a healthy weight. Lose fat and build lean muscle. Lower your risk for serious health problems. Keep your bones, muscles, and joints strong. Being fit lets you do more physical activity. And it lets you work out harder without as much effort. How can you make physical activity part of your life? Get at least 30 minutes of exercise on most days of the week. Walking is a good choice. You also may want to do other activities, such as running, swimming, cycling, or playing tennis or team sports.   Pick activities that you like--ones that make your heart beat faster, your muscles stronger, and your muscles and joints more flexible. If you find more than one thing you like doing, do them all. You don't have to do the same thing every day. Get your heart pumping every day. Any activity that makes your heart beat faster and keeps it at that rate for a while counts. Here are some great ways to get your heart beating faster:  Go for a brisk walk, run, or bike ride. Go for a hike or swim. Go in-line skating. Play a game of touch football, basketball, or soccer. Ride a bike. Play tennis or racquetball. Climb stairs. Even some household chores can be aerobic--just do them at a faster pace. Vacuuming, raking or mowing the lawn, sweeping the garage, and washing and waxing the car all can help get your heart rate up. Strengthen your muscles during the week. You don't have to lift heavy weights or grow big, bulky muscles to get stronger. Doing a few simple activities that make your muscles work against, or \"resist,\" something can help you get stronger. For example, you can:  Do push-ups or sit-ups, which use your own body weight as resistance. Lift weights or dumbbells or use stretch bands at home or in a gym or community center. Stretch your muscles often. Stretching will help you as you become more active. It can help you stay flexible, loosen tight muscles, and avoid injury. It can also help improve your balance and posture and can be a great way to relax. Be sure to stretch the muscles you'll be using when you work out. Its best to warm your muscles slightly before you stretch them. Walk or do some other light aerobic activity for a few minutes, and then start stretching. When you stretch your muscles:  Do it slowly. Stretching is not about going fast or making sudden movements. Don't push or bounce during a stretch. Hold each stretch for at least 15 to 30 seconds, if you can. You should feel a stretch in the muscle, but not pain. Breathe out as you do the stretch.  Then breathe in as you hold the stretch. Don't hold your breath. If you're worried about how more activity might affect your health, have a checkup before you start. Follow any special advice your doctor gives you for getting a smart start. Where can you learn more? Go to [employer]  Enter [id number] in the search box to learn more about \"Learning About Physical Activity. \"   © 2571-8646 [employer]. Care instructions adapted under license by [employer] (which disclaims liability or warranty for this information). This care instruction is for use with your licensed healthcare professional. If you have questions about a medical condition or this instruction, always ask your healthcare professional. [address] any warranty or liability for your use of this information. Content Version: 86.4.778349; Current as of: November 15, 2013                                          Learning About CPAP for Sleep Apnea  What is CPAP? CPAP is a small machine that you use at home every night while you sleep. It increases air pressure in your throat to keep your airway open. When you have sleep apnea, this can help you sleep better so you feel much better. CPAP stands for \"continuous positive airway pressure. \"  The CPAP machine will have one of the following:  A mask that covers your nose and mouth  Prongs that fit into your nose  A mask that covers your nose only, the most common type. This type is called NCPAP. The N stands for \"nasal.\"  Why is it done? CPAP is usually the best treatment for obstructive sleep apnea. It is the first treatment choice and the most widely used. Your doctor may suggest CPAP if you have: Moderate to severe sleep apnea. Sleep apnea and coronary artery disease (CAD) or heart failure. How does it help? CPAP can help you have more normal sleep, so you feel less sleepy and more alert during the daytime.   CPAP may help keep heart failure or other heart problems from getting worse. CPAP may help lower your blood pressure. If you use CPAP, your bed partner may also sleep better because you are not snoring or restless. What are the side effects? Some people who use CPAP have:  A dry or stuffy nose and a sore throat. Irritated skin on the face. Sore eyes. Bloating. If you have any of these problems, work with your doctor to fix them. Here are some things you can try:  Be sure the mask or nasal prongs fit well. See if your doctor can adjust the pressure of your CPAP. If your nose is dry, try a humidifier. If your nose is runny or stuffy, try decongestant medicine or a steroid nasal spray. Be safe with medicines. Read and follow all instructions on the label. Do not use the medicine longer than the label says. If these things do not help, you might try a different type of machine. Some machines have air pressure that adjusts on its own. Others have air pressures that are different when you breathe in than when you breathe out. This may reduce discomfort caused by too much pressure in your nose. Where can you learn more? Go to [employer]  Enter [place] in the search box to learn more about \"Learning About CPAP for Sleep Apnea. \"   © 7621-1153 Healthwise, Incorporated. Care instructions adapted under license by [facility] (which disclaims liability or warranty for this information). This care instruction is for use with your licensed healthcare professional. If you have questions about a medical condition or this instruction, always ask your healthcare professional. [place] [address] any warranty or liability for your use of this information.   Content Version: 03.8.410548; Current as of: January 24, 2014

## 2022-11-20 ENCOUNTER — HOSPITAL ENCOUNTER (OUTPATIENT)
Dept: SLEEP MEDICINE | Age: 52
Discharge: HOME | End: 2022-11-23
Payer: COMMERCIAL

## 2022-11-20 PROCEDURE — 95811 POLYSOM 6/>YRS CPAP 4/> PARM: CPT

## 2022-12-01 ENCOUNTER — TELEPHONE (OUTPATIENT)
Dept: SLEEP MEDICINE | Age: 52
End: 2022-12-01

## 2022-12-01 DIAGNOSIS — G47.33 OSA (OBSTRUCTIVE SLEEP APNEA): Primary | ICD-10-CM

## 2022-12-01 NOTE — TELEPHONE ENCOUNTER
Patient had recent sleep study showing severe sleep apnea. Cpap therapy is recommended per sleep interp. Patient has agreed to start CPAP therapy. Order needs to be sent to [address] [place] [address].

## 2022-12-09 ENCOUNTER — OFFICE VISIT (OUTPATIENT)
Dept: FAMILY MEDICINE CLINIC | Facility: CLINIC | Age: 52
End: 2022-12-09
Payer: COMMERCIAL

## 2022-12-09 VITALS
TEMPERATURE: 97.2 F | HEART RATE: 81 BPM | WEIGHT: 253 LBS | HEIGHT: 68 IN | SYSTOLIC BLOOD PRESSURE: 138 MMHG | RESPIRATION RATE: 16 BRPM | BODY MASS INDEX: 38.34 KG/M2 | DIASTOLIC BLOOD PRESSURE: 88 MMHG | OXYGEN SATURATION: 99 %

## 2022-12-09 DIAGNOSIS — Z11.59 ENCOUNTER FOR HEPATITIS C SCREENING TEST FOR LOW RISK PATIENT: ICD-10-CM

## 2022-12-09 DIAGNOSIS — I10 HYPERTENSION, ESSENTIAL, BENIGN: ICD-10-CM

## 2022-12-09 DIAGNOSIS — E66.9 OBESITY (BMI 30-39.9): ICD-10-CM

## 2022-12-09 DIAGNOSIS — Z11.4 ENCOUNTER FOR SCREENING FOR HIV: ICD-10-CM

## 2022-12-09 DIAGNOSIS — E78.2 MIXED HYPERLIPIDEMIA: Primary | ICD-10-CM

## 2022-12-09 DIAGNOSIS — J30.1 NON-SEASONAL ALLERGIC RHINITIS DUE TO POLLEN: ICD-10-CM

## 2022-12-09 LAB
BASOPHILS # BLD: 0.1 K/UL (ref 0–0.2)
BASOPHILS NFR BLD: 2 % (ref 0–2)
DIFFERENTIAL METHOD BLD: ABNORMAL
EOSINOPHIL # BLD: 0.1 K/UL (ref 0–0.8)
EOSINOPHIL NFR BLD: 3 % (ref 0.5–7.8)
ERYTHROCYTE [DISTWIDTH] IN BLOOD BY AUTOMATED COUNT: 12.5 % (ref 11.9–14.6)
HCT VFR BLD AUTO: 49.6 % (ref 41.1–50.3)
HGB BLD-MCNC: 16.9 G/DL (ref 13.6–17.2)
IMM GRANULOCYTES # BLD AUTO: 0 K/UL (ref 0–0.5)
IMM GRANULOCYTES NFR BLD AUTO: 0 % (ref 0–5)
LYMPHOCYTES # BLD: 1.6 K/UL (ref 0.5–4.6)
LYMPHOCYTES NFR BLD: 38 % (ref 13–44)
MCH RBC QN AUTO: 28.7 PG (ref 26.1–32.9)
MCHC RBC AUTO-ENTMCNC: 34.1 G/DL (ref 31.4–35)
MCV RBC AUTO: 84.4 FL (ref 82–102)
MONOCYTES # BLD: 0.3 K/UL (ref 0.1–1.3)
MONOCYTES NFR BLD: 6 % (ref 4–12)
NEUTS SEG # BLD: 2.2 K/UL (ref 1.7–8.2)
NEUTS SEG NFR BLD: 51 % (ref 43–78)
NRBC # BLD: 0 K/UL (ref 0–0.2)
PLATELET # BLD AUTO: 198 K/UL (ref 150–450)
PMV BLD AUTO: 12.2 FL (ref 9.4–12.3)
RBC # BLD AUTO: 5.88 M/UL (ref 4.23–5.6)
WBC # BLD AUTO: 4.3 K/UL (ref 4.3–11.1)

## 2022-12-09 PROCEDURE — 99214 OFFICE O/P EST MOD 30 MIN: CPT | Performed by: FAMILY MEDICINE

## 2022-12-09 PROCEDURE — 3074F SYST BP LT 130 MM HG: CPT | Performed by: FAMILY MEDICINE

## 2022-12-09 PROCEDURE — 3079F DIAST BP 80-89 MM HG: CPT | Performed by: FAMILY MEDICINE

## 2022-12-09 RX ORDER — ATORVASTATIN CALCIUM 10 MG/1
10 TABLET, FILM COATED ORAL
Qty: 90 TABLET | Refills: 1 | Status: SHIPPED | OUTPATIENT
Start: 2022-12-09

## 2022-12-09 RX ORDER — BENAZEPRIL HYDROCHLORIDE 20 MG/1
20 TABLET ORAL DAILY
Qty: 90 TABLET | Refills: 1 | Status: SHIPPED | OUTPATIENT
Start: 2022-12-09

## 2022-12-09 RX ORDER — FLUTICASONE PROPIONATE 50 MCG
2 SPRAY, SUSPENSION (ML) NASAL DAILY
Qty: 16 G | Refills: 2 | Status: SHIPPED | OUTPATIENT
Start: 2022-12-09

## 2022-12-09 RX ORDER — CETIRIZINE HYDROCHLORIDE 10 MG/1
10 TABLET ORAL DAILY
Qty: 90 TABLET | Refills: 1 | Status: CANCELLED | OUTPATIENT
Start: 2022-12-09

## 2022-12-09 ASSESSMENT — ENCOUNTER SYMPTOMS
EYE REDNESS: 0
EYE DISCHARGE: 0
COUGH: 0
SHORTNESS OF BREATH: 0
WHEEZING: 0
SORE THROAT: 0
NAUSEA: 0
EYE PAIN: 0
VOMITING: 0
SINUS PAIN: 0
DIARRHEA: 0
PHOTOPHOBIA: 0
ABDOMINAL PAIN: 0
EYE ITCHING: 0

## 2022-12-09 ASSESSMENT — PATIENT HEALTH QUESTIONNAIRE - PHQ9
SUM OF ALL RESPONSES TO PHQ QUESTIONS 1-9: 0
SUM OF ALL RESPONSES TO PHQ QUESTIONS 1-9: 0
1. LITTLE INTEREST OR PLEASURE IN DOING THINGS: 0
SUM OF ALL RESPONSES TO PHQ QUESTIONS 1-9: 0
2. FEELING DOWN, DEPRESSED OR HOPELESS: 0
SUM OF ALL RESPONSES TO PHQ QUESTIONS 1-9: 0
SUM OF ALL RESPONSES TO PHQ9 QUESTIONS 1 & 2: 0

## 2022-12-09 NOTE — PROGRESS NOTES
Morenita Mathew (:  1970) is a 47 y.o. male,Established patient, here for evaluation of the following chief complaint(s):  Medication Refill and Labs Only         ASSESSMENT/PLAN:  1. Mixed hyperlipidemia  -     atorvastatin (LIPITOR) 10 MG tablet; Take 1 tablet by mouth Daily with lunch, Disp-90 tablet, R-1Normal  -     Comprehensive Metabolic Panel; Future  -     Lipid Panel; Future  2. Hypertension, essential, benign  -     benazepril (LOTENSIN) 20 MG tablet; Take 1 tablet by mouth daily TAKE 1 TAB BY MOUTH DAILY. , Disp-90 tablet, R-1Normal  -     CBC with Auto Differential; Future  -     Comprehensive Metabolic Panel; Future  3. Non-seasonal allergic rhinitis due to pollen  -     fluticasone (FLONASE) 50 MCG/ACT nasal spray; 2 sprays by Nasal route daily, Disp-16 g, R-2Normal  4. Obesity (BMI 30-39.9)  5. BMI 38.0-38.9,adult  6. Encounter for screening for HIV  -     HIV 1/2 Ag/Ab, 4TH Generation,W Rflx Confirm  7. Encounter for hepatitis C screening test for low risk patient  -     Hepatitis C Antibody    Await labs, to continue to avoid salt, strongly advised to work on a low fat diet. To continue to follow up with Sleep Medicine, to start using the CPAP machine. Strongly advised patient to lose weight. Also advised to exercise regularly, to eat healthy foods, and to control portion sizes. Return in about 6 months (around 2023) for medication refills, fasting labs or prn sooner. Subjective   SUBJECTIVE/OBJECTIVE:  Medication Refill  Pertinent negatives include no abdominal pain, chest pain, chills, congestion, coughing, fever, nausea, sore throat or vomiting. Patient comes today for follow-up, is fasting for labs, denies any side effects from his medicines, denies any associated symptoms. Says he was diagnosed with severe Sleep Apnea, was not using his old machine, still waiting for a new machine. He is now taking Melatonin 5 mg- sleeping better.     Hyperlipidemia, Obesity- eats out 50% of the time and fixes his meals at other times; careful with avoiding fatty foods- more careful; eating snacks at work- has a candy drawer- has cut that. He is taking Atorvastatin 10 mg with dinner, denies any side effects. He stopped exercising as his gym closed, lifts weights off and on, does not do any cardio. He has gained about 2 lbs in the last 6 months and 8 lbs in the last 1 year. Hypertension- he is still eating fast foods - but avoids salt in his diet, takes Benazepril 20mg in am, takes his medications regularly and denies any side effects or any associated symptoms. He had an eye appointment in August 2022-  seeing floaters rarely. He does not check his BP typically. Seasonal allergies; (h/o bee sting reaction) - He had been using Flonase NS at HS with Zyrtec prn at HS; stopped Saline NS; has tried Manasa Hesham in the past. He typically has symptoms in the spring. Denies symptoms today. (He had a bad quick reaction to a bee sting with swelling on the right side of his neck in 2018- requests a refill for the Epipen.)    (Elevated PSA- patient established care with Urology; had an MRI has a repeat PSA on July 30th and a follow up on August 6th.)    Review of Systems   Constitutional:  Negative for chills and fever. HENT:  Negative for congestion, ear discharge, ear pain, hearing loss, postnasal drip, sinus pain and sore throat. Eyes:  Negative for photophobia, pain, discharge, redness, itching and visual disturbance. Respiratory:  Negative for cough, shortness of breath and wheezing. Cardiovascular:  Negative for chest pain, palpitations and leg swelling. Gastrointestinal:  Negative for abdominal pain, diarrhea, nausea and vomiting. Objective   Physical Exam  Vitals and nursing note reviewed. Constitutional:       General: He is not in acute distress. Appearance: He is obese. HENT:      Head: Normocephalic and atraumatic.       Comments: No sinus tenderness Right Ear: Tympanic membrane and ear canal normal.      Left Ear: Tympanic membrane and ear canal normal.      Nose: Nose normal. No congestion. Mouth/Throat:      Mouth: Mucous membranes are moist.      Pharynx: Oropharynx is clear. No oropharyngeal exudate or posterior oropharyngeal erythema. Eyes:      General:         Right eye: No discharge. Left eye: No discharge. Conjunctiva/sclera: Conjunctivae normal.      Pupils: Pupils are equal, round, and reactive to light. Cardiovascular:      Rate and Rhythm: Normal rate and regular rhythm. Pulmonary:      Effort: Pulmonary effort is normal.      Breath sounds: Normal breath sounds. Abdominal:      General: Bowel sounds are normal.      Palpations: Abdomen is soft. Tenderness: There is no abdominal tenderness. Musculoskeletal:      Cervical back: Neck supple. No tenderness. Right lower leg: No edema. Left lower leg: No edema. Neurological:      Mental Status: He is alert. An electronic signature was used to authenticate this note.     --Matt Fowler MD

## 2022-12-10 LAB
ALBUMIN SERPL-MCNC: 4.1 G/DL (ref 3.5–5)
ALBUMIN/GLOB SERPL: 1.3 {RATIO} (ref 0.4–1.6)
ALP SERPL-CCNC: 63 U/L (ref 50–136)
ALT SERPL-CCNC: 50 U/L (ref 12–65)
ANION GAP SERPL CALC-SCNC: 8 MMOL/L (ref 2–11)
AST SERPL-CCNC: 31 U/L (ref 15–37)
BILIRUB SERPL-MCNC: 0.7 MG/DL (ref 0.2–1.1)
BUN SERPL-MCNC: 10 MG/DL (ref 6–23)
CALCIUM SERPL-MCNC: 9.2 MG/DL (ref 8.3–10.4)
CHLORIDE SERPL-SCNC: 106 MMOL/L (ref 101–110)
CHOLEST SERPL-MCNC: 135 MG/DL
CO2 SERPL-SCNC: 27 MMOL/L (ref 21–32)
CREAT SERPL-MCNC: 1.2 MG/DL (ref 0.8–1.5)
GLOBULIN SER CALC-MCNC: 3.2 G/DL (ref 2.8–4.5)
GLUCOSE SERPL-MCNC: 117 MG/DL (ref 65–100)
HDLC SERPL-MCNC: 51 MG/DL (ref 40–60)
HDLC SERPL: 2.6 {RATIO}
LDLC SERPL CALC-MCNC: 72.8 MG/DL
POTASSIUM SERPL-SCNC: 3.7 MMOL/L (ref 3.5–5.1)
PROT SERPL-MCNC: 7.3 G/DL (ref 6.3–8.2)
SODIUM SERPL-SCNC: 141 MMOL/L (ref 133–143)
TRIGL SERPL-MCNC: 56 MG/DL (ref 35–150)
VLDLC SERPL CALC-MCNC: 11.2 MG/DL (ref 6–23)

## 2022-12-11 LAB
HCV AB SER QL: NONREACTIVE
HIV 1+2 AB+HIV1 P24 AG SERPL QL IA: NONREACTIVE
HIV 1/2 RESULT COMMENT: NORMAL

## 2022-12-27 ENCOUNTER — PREP FOR PROCEDURE (OUTPATIENT)
Dept: SURGERY | Age: 52
End: 2022-12-27

## 2022-12-27 RX ORDER — SODIUM CHLORIDE 0.9 % (FLUSH) 0.9 %
5-40 SYRINGE (ML) INJECTION PRN
Status: CANCELLED | OUTPATIENT
Start: 2022-12-27

## 2022-12-27 RX ORDER — SODIUM CHLORIDE 0.9 % (FLUSH) 0.9 %
5-40 SYRINGE (ML) INJECTION EVERY 12 HOURS SCHEDULED
Status: CANCELLED | OUTPATIENT
Start: 2022-12-27

## 2022-12-27 RX ORDER — SODIUM CHLORIDE 9 MG/ML
INJECTION, SOLUTION INTRAVENOUS PRN
Status: CANCELLED | OUTPATIENT
Start: 2022-12-27

## 2023-01-18 NOTE — PERIOP NOTE
Patient verified name, , and procedure. Type: 1a; abbreviated assessment per anesthesia guidelines    Labs per anesthesia: na    Instructed pt that they will be notified the day before their procedure by the GI Lab for time [facility] [place] [facility]. Arrival times should be called by 5 pm. If no phone is received the patient should contact their respective hospital. The GI lab telephone number is [phone] and ES Pre-op is [phone]. Follow diet and prep instructions per office including NPO status. If patient has NOT received instructions from office patient is advised to call surgeon office, verbalizes understanding. Bath or shower the night before and the am of surgery with non-moisturizing soap. No lotions, oils, powders, cologne on skin. No make up, eye make up or jewelry. Wear loose fitting comfortable, clean clothing. Must have adult present in building the entire time . Medications for the day of procedure none, patient to hold benazepril per anesthesia guidelines. The following discharge instructions reviewed with patient: medication given during procedure may cause drowsiness for several hours, therefore, do not drive or operate machinery for remainder of the day. You may not drink alcohol on the day of your procedure, please resume regular diet and activity unless otherwise directed. You may experience abdominal distention for several hours that is relieved by the passage of gas. Contact your physician if you have any of the following: fever or chills, severe abdominal pain or excessive amount of bleeding or a large amount when having a bowel movement.  Occasional specks of blood with bowel movement would not be unusual.

## 2023-01-25 NOTE — PROGRESS NOTES
Spoke with patient. He was informed to arrive at 0800 for his procedure at [id number]. He was informed to register downstairs prior to arrival in the GI lab and that he must have someone bring him, stay and drive him home. Patient verbalized understanding.

## 2023-01-26 ENCOUNTER — ANESTHESIA EVENT (OUTPATIENT)
Dept: ENDOSCOPY | Age: 53
End: 2023-01-26
Payer: COMMERCIAL

## 2023-01-26 ENCOUNTER — ANESTHESIA (OUTPATIENT)
Dept: ENDOSCOPY | Age: 53
End: 2023-01-26
Payer: COMMERCIAL

## 2023-01-26 ENCOUNTER — HOSPITAL ENCOUNTER (OUTPATIENT)
Age: 53
Setting detail: OUTPATIENT SURGERY
Discharge: HOME | End: 2023-01-26
Attending: SURGERY | Admitting: SURGERY
Payer: COMMERCIAL

## 2023-01-26 VITALS
TEMPERATURE: 97.7 F | BODY MASS INDEX: 37.89 KG/M2 | HEART RATE: 80 BPM | OXYGEN SATURATION: 100 % | WEIGHT: 250 LBS | DIASTOLIC BLOOD PRESSURE: 88 MMHG | SYSTOLIC BLOOD PRESSURE: 155 MMHG | RESPIRATION RATE: 20 BRPM | HEIGHT: 68 IN

## 2023-01-26 PROCEDURE — 7100000010 HC PHASE II RECOVERY - FIRST 15 MIN: Performed by: SURGERY

## 2023-01-26 PROCEDURE — 2580000003 HC RX 258: Performed by: ANESTHESIOLOGY

## 2023-01-26 PROCEDURE — 3700000000 HC ANESTHESIA ATTENDED CARE: Performed by: SURGERY

## 2023-01-26 PROCEDURE — 3700000001 HC ADD 15 MINUTES (ANESTHESIA): Performed by: SURGERY

## 2023-01-26 PROCEDURE — 2500000003 HC RX 250 WO HCPCS: Performed by: REGISTERED NURSE

## 2023-01-26 PROCEDURE — 7100000011 HC PHASE II RECOVERY - ADDTL 15 MIN: Performed by: SURGERY

## 2023-01-26 PROCEDURE — 3609010700 HC COLONOSCOPY POLYPECTOMY REMOVAL SNARE/STOMA: Performed by: SURGERY

## 2023-01-26 PROCEDURE — 88305 TISSUE EXAM BY PATHOLOGIST: CPT

## 2023-01-26 PROCEDURE — 2709999900 HC NON-CHARGEABLE SUPPLY: Performed by: SURGERY

## 2023-01-26 PROCEDURE — 6360000002 HC RX W HCPCS: Performed by: REGISTERED NURSE

## 2023-01-26 RX ORDER — SODIUM CHLORIDE 0.9 % (FLUSH) 0.9 %
5-40 SYRINGE (ML) INJECTION EVERY 12 HOURS SCHEDULED
Status: DISCONTINUED | OUTPATIENT
Start: 2023-01-26 | End: 2023-01-26 | Stop reason: HOSPADM

## 2023-01-26 RX ORDER — LIDOCAINE HYDROCHLORIDE 20 MG/ML
INJECTION, SOLUTION EPIDURAL; INFILTRATION; INTRACAUDAL; PERINEURAL PRN
Status: DISCONTINUED | OUTPATIENT
Start: 2023-01-26 | End: 2023-01-26 | Stop reason: SDUPTHER

## 2023-01-26 RX ORDER — PROPOFOL 10 MG/ML
INJECTION, EMULSION INTRAVENOUS CONTINUOUS PRN
Status: DISCONTINUED | OUTPATIENT
Start: 2023-01-26 | End: 2023-01-26 | Stop reason: SDUPTHER

## 2023-01-26 RX ORDER — PROPOFOL 10 MG/ML
INJECTION, EMULSION INTRAVENOUS PRN
Status: DISCONTINUED | OUTPATIENT
Start: 2023-01-26 | End: 2023-01-26 | Stop reason: SDUPTHER

## 2023-01-26 RX ORDER — SODIUM CHLORIDE 0.9 % (FLUSH) 0.9 %
5-40 SYRINGE (ML) INJECTION PRN
Status: DISCONTINUED | OUTPATIENT
Start: 2023-01-26 | End: 2023-01-26 | Stop reason: HOSPADM

## 2023-01-26 RX ORDER — SODIUM CHLORIDE, SODIUM LACTATE, POTASSIUM CHLORIDE, CALCIUM CHLORIDE 600; 310; 30; 20 MG/100ML; MG/100ML; MG/100ML; MG/100ML
INJECTION, SOLUTION INTRAVENOUS CONTINUOUS
Status: DISCONTINUED | OUTPATIENT
Start: 2023-01-26 | End: 2023-01-26 | Stop reason: HOSPADM

## 2023-01-26 RX ORDER — SODIUM CHLORIDE 9 MG/ML
INJECTION, SOLUTION INTRAVENOUS PRN
Status: DISCONTINUED | OUTPATIENT
Start: 2023-01-26 | End: 2023-01-26 | Stop reason: HOSPADM

## 2023-01-26 RX ADMIN — SODIUM CHLORIDE, POTASSIUM CHLORIDE, SODIUM LACTATE AND CALCIUM CHLORIDE: 600; 310; 30; 20 INJECTION, SOLUTION INTRAVENOUS at 08:52

## 2023-01-26 RX ADMIN — PROPOFOL 200 MCG/KG/MIN: 10 INJECTION, EMULSION INTRAVENOUS at 09:25

## 2023-01-26 RX ADMIN — LIDOCAINE HYDROCHLORIDE 50 MG: 20 INJECTION, SOLUTION EPIDURAL; INFILTRATION; INTRACAUDAL; PERINEURAL at 09:25

## 2023-01-26 RX ADMIN — PROPOFOL 50 MG: 10 INJECTION, EMULSION INTRAVENOUS at 09:25

## 2023-01-26 ASSESSMENT — PAIN - FUNCTIONAL ASSESSMENT
PAIN_FUNCTIONAL_ASSESSMENT: 0-10
PAIN_FUNCTIONAL_ASSESSMENT: NONE - DENIES PAIN
PAIN_FUNCTIONAL_ASSESSMENT: 0-10

## 2023-01-26 NOTE — DISCHARGE INSTRUCTIONS
Gastrointestinal Colonoscopy/Flexible Sigmoidoscopy - Lower Exam Discharge Instructions  Call Dr. Christopher Leyva at [phone] for any problems or questions. Contact the doctors office for follow up appointment as directed  Medication may cause drowsiness for several hours, therefore, do not drive or operate machinery for remainder of the day. Do not make any legal decisions today. No alcohol today. Ordinarily, you may resume regular diet and activity after exam unless otherwise specified by your physician. Because of air put into your colon during exam, you may experience some abdominal distension, relieved by the passage of gas, for several hours. Contact your physician if you have any of the following:  Excessive amount of bleeding - large amount when having a bowel movement. Occasional specks of blood with bowel movement would not be unusual.  Severe abdominal pain  Fever or Chills  Polyp Removal - follow these additional instructions  Take Metamucil - 1 tablespoon in juice every morning for 3 days if needed; avoid constipation  No Aspirin, Advil, Aleve, Nuprin, Ibuprofen, or medications that contain these drugs for 2 weeks.   Any additional instructions:    See Dr. Christopher Leyva in office on 2/14 at 2:00 PM

## 2023-01-26 NOTE — H&P
[address] [address]  [phone]    H&P Note   Morgan Marcelo   MRN: [id number]     : 1970        HPI: Morgan Marcelo is a 47 y.o. male who returns to the office following his initial screening colonoscopy. He was high risk screening due to family history. He had significant findings. He had 3 significant polyps removed from the ascending colon. One was in the cecum, one was in the distal ascending colon and one was in the hepatic flexure. All 3 of these polyps were adenomas. Greater than 10 mm polyp in the distal ascending colon was a tubulovillous adenoma. The other 2 polyps were tubular adenomas. Patient completed a 1 day prep as directed. Unfortunately this did not completely clear out his colon for complete visualization of the left side of his colon. From the splenic flexure to the mid descending colon, 30% about lumen cannot be cleared. Given his significant histology, I am recommending repeat colonoscopy in 6 months. We can have a short interval follow-up of the polyps that were removed as well as to clear the remaining portion of the unseen colon. We will recommend a 2-day prep. He otherwise tolerated the colonoscopy procedure well. I gave him copy of his pathology report and explained the findings. He had his second colonoscopy in May 2019. He had an excellent result from the 2-day prep. Additional small polyps were removed from the rectum and sigmoid colon. The sigmoid polyp was hyperplastic but the rectal polyp was a tubulovillous adenoma. Surveillance recommendation is made for 3 years in . He was referred by Dr. Eduardo Hawkins for initial colonoscopy. The patient is of above average risk for colon cancer. Patient has a positive family history of colon cancer in his paternal grandfather who was diagnosed in his [de-identified].  He has uncles on both sides of the family that have had colon polyps. He has several brothers that have colon polyps since their 45s. He believes that his father also has had colon polyps. He has no GI complaints. He has normal daily to 2 times a day bowel movements. He denies any blood, mucus or change in caliber of stools. He denies any prior abdominal or perianal surgeries. He has never had any therapeutic radiation.        Past Medical History:   Diagnosis Date    Dyslipidemia 5/8/2014    Family history of prostate cancer 5/8/2014    Fracture of right wrist 1979    HTN (hypertension) 5/8/2014    Hyperlipidemia     QUIRINO (obstructive sleep apnea) 5/8/2014    cpap    Prolonged emergence from general anesthesia      Past Surgical History:   Procedure Laterality Date    COLONOSCOPY N/A 5/2/2019    COLONOSCOPY/ 37 performed by Alberto Cardenas MD at [facility] ENDOSCOPY    COLONOSCOPY N/A 11/8/2018    COLONOSCOPY / BMI=36 performed by Alberto Cardenas MD at [facility] ENDOSCOPY     Current Facility-Administered Medications   Medication Dose Route Frequency    sodium chloride flush 0.9 % injection 5-40 mL  5-40 mL IntraVENous 2 times per day    sodium chloride flush 0.9 % injection 5-40 mL  5-40 mL IntraVENous PRN    0.9 % sodium chloride infusion   IntraVENous PRN    lactated ringers IV soln infusion   IntraVENous Continuous     ALLERGIES:  Bee venom    Social History     Socioeconomic History    Marital status: Single     Spouse name: None    Number of children: 0    Years of education: None    Highest education level: None   Occupational History    Occupation:      Comment: [employer]   Tobacco Use    Smoking status: Never    Smokeless tobacco: Never   Vaping Use    Vaping Use: Never used   Substance and Sexual Activity    Alcohol use: No    Drug use: No    Sexual activity: Not Currently     Partners: Female     Social History     Tobacco Use   Smoking Status Never   Smokeless Tobacco Never     Family History   Problem Relation Age of Onset    Hypertension Mother         ESRD Diabetes Father     Diabetes Sister         s/p amputation    Uterine Cancer Sister 64    Hypertension Sister     Hypertension Brother     Cancer Paternal Grandfather 80        colon cancer    Cancer Maternal Uncle 72        prostate cancer    Prostate Cancer Paternal Uncle         in 3 uncles       ROS: The patient has no difficulty with chest pain or shortness of breath. No fever or chills. The patient denies any personal or family history of abnormal clotting or bleeding. Comprehensive review of systems was otherwise unremarkable except as noted above. Physical Exam:   Constitutional: Alert oriented cooperative patient in no acute distress. BP (!) 175/89   Pulse 79   Temp 98.3 °F (36.8 °C) (Oral)   Resp 16   Ht 5' 8\" (1.727 m)   Wt 250 lb (113.4 kg)   SpO2 98%   BMI 38.01 kg/m²   Eyes:Sclera are clear without icterus. ENMT: no obvious neck masses, no ear or lip lesions  CV: RRR. Normal perfusion  Resp: No JVD. Breathing is  non-labored. GI: soft and non-distended     Musculoskeletal: unremarkable with normal function. Neuro:  No obvious focal deficits  Psychiatric: normal affect and mood, no memory impairment    Lab Results   Component Value Date/Time    WBC 4.3 12/09/2022 08:38 AM    HGB 16.9 12/09/2022 08:38 AM    HCT 49.6 12/09/2022 08:38 AM     12/09/2022 08:38 AM    MCV 84.4 12/09/2022 08:38 AM       Lab Results   Component Value Date/Time     12/09/2022 08:38 AM    K 3.7 12/09/2022 08:38 AM     12/09/2022 08:38 AM    CO2 27 12/09/2022 08:38 AM    BUN 10 12/09/2022 08:38 AM    ALT 50 12/09/2022 08:38 AM       Assessment/Plan:     Nkechi Hawkins is a 47 y.o. male who has significant findings on his initial colonoscopy. He had 3 adenomatous polyps on the right colon. He had incomplete visualization of the left colon and will require 2-day prep for next colonoscopy. We discussed short interval colonoscopy in 6 months.   The colonoscopy also identified and removed polyps. A tubulovillous adenoma was present in the rectum and removed. Other polyps were hyperplastic. 3-year interval recommendation for 2022 was recommended. He had an excellent prep with the 2-day prep. He now presents for his 3-year surveillance colonoscopy. We fashioned a 2-day prep. He should do well with the adult colonoscope. I discussed the patient's condition and treatment options with the patient. I discussed risks of colonoscopy in language the patient could understand including bleeding, infection, aspiration, perforation, medication reaction, need for further endoscopy or surgery, abscess, fistula, SBO, DVT, PE, heart attack, stroke, renal failure, respiratory failure, ventilatory dependence, and death. The patient voiced understanding of all this and all questions were answered. Alternatives to colonoscopy were discussed also and risks of the alternatives. The patient requested that we proceed with colonoscopy. Informed consent was obtained. He understands that if his colonoscopy is negative at this next session then we will recommend an interval surveillance of 3 years. Unfortunately, he only did a 1 day prep and ate a pear and some crackers yesterday morning. He knows that he may not been prepped well enough for adequate exam.     Patient Active Problem List    Diagnosis Date Noted    Non-restorative sleep 10/19/2022     Priority: Medium    Poor sleep hygiene 10/19/2022     Priority: Medium    BMI 38.0-38.9,adult 06/15/2022     Priority: Medium    Allergic rhinitis due to pollen 06/11/2020    Adenomatous polyp of ascending colon 12/07/2018     Initial colonoscopy 11/8/2018  DIAGNOSIS   A: CECAL POLYP: FRAGMENTS OF TUBULAR ADENOMA. B: ASCENDING COLON POLYP: FRAGMENTS OF MIXED TUBULOVILLOUS ADENOMA. C: HEPATIC FLEXURE POLYP: FRAGMENT OF TUBULAR ADENOMA. Electronically signed out on 11/9/2018 10:16 by CHRIS Meek M.D.          Bee sting reaction 11/19/2018    FH: colon polyps 07/17/2018    Severe obesity with body mass index (BMI) of 35.0 to 39.9 with serious comorbidity ([facility] [address] 07/17/2018    Family history of colon cancer 06/19/2018    Prostate cancer screening 06/19/2018    Colon cancer screening 06/19/2018    Mixed hyperlipidemia 11/16/2017    BMI 36.0-36.9,adult 05/24/2017    Cyst of left sphenoid sinus 04/26/2017    Maxillary sinus cyst 04/26/2017    Abnormal liver function 04/18/2017    Swelling of left side of face 04/03/2017    Environmental and seasonal allergies 03/20/2017    BMI 35.0-35.9,adult 12/30/2016    Chronic kidney disease 12/30/2016    Blurry vision, right eye 12/30/2016    BMI 34.0-34.9,adult 11/10/2016    Obesity (BMI 30-39.9) 11/10/2016    Hypertension, essential, benign 07/28/2014    Family history of prostate cancer 05/08/2014    Dyslipidemia 05/08/2014    QUIRINO (obstructive sleep apnea) 05/08/2014        Kimber Rene MD,  FACS

## 2023-01-26 NOTE — ANESTHESIA PRE PROCEDURE
Department of Anesthesiology  Preprocedure Note       Name:  Darwin Blackman   Age:  47 y.o.  :  1970                                          MRN:  [id number]         Date:  2023      Surgeon: Delilah Rudd):  Flores Mccray MD    Procedure: Procedure(s):  COLORECTAL CANCER SCREENING, NOT HIGH RISK/38    Medications prior to admission:   Prior to Admission medications    Medication Sig Start Date End Date Taking? Authorizing Provider   atorvastatin (LIPITOR) 10 MG tablet Take 1 tablet by mouth Daily with lunch 22   Blanca Mauricio MD   benazepril (LOTENSIN) 20 MG tablet Take 1 tablet by mouth daily TAKE 1 TAB BY MOUTH DAILY. 22   Blanca Mauricio MD   fluticasone (FLONASE) 50 MCG/ACT nasal spray 2 sprays by Nasal route daily 22   Blanca Mauricio MD   Melatonin 5 MG CAPS Take 5 mg by mouth at bedtime 10/19/22   Ashely Ogden MD   cetirizine (ZYRTEC) 10 MG tablet Take 1 tablet by mouth daily 6/15/22   Blanca Mauricio MD       Current medications:    Current Facility-Administered Medications   Medication Dose Route Frequency Provider Last Rate Last Admin    sodium chloride flush 0.9 % injection 5-40 mL  5-40 mL IntraVENous 2 times per day Flores Mccray MD        sodium chloride flush 0.9 % injection 5-40 mL  5-40 mL IntraVENous PRN Flores Mccray MD        0.9 % sodium chloride infusion   IntraVENous PRN Flores Mccray MD        lactated ringers IV soln infusion   IntraVENous Continuous Peter Campi IV,  mL/hr at 23 0852 New Bag at 23 3950       Allergies:     Allergies   Allergen Reactions    Bee Venom Anaphylaxis       Problem List:    Patient Active Problem List   Diagnosis Code    BMI 34.0-34.9,adult Z68.34    BMI 36.0-36.9,adult Z68.36    Family history of colon cancer Z80.0    Family history of prostate cancer Z80.42    Prostate cancer screening Z12.5    Swelling of left side of face R22.0    Dyslipidemia E78.5    Environmental and seasonal allergies J30.89    BMI 35.0-35.9,adult Z68.35    Allergic rhinitis due to pollen J30.1    Obesity (BMI 30-39. 9) E66.9    Bee sting reaction T63.441A    FH: colon polyps Z83.71    QUIRINO (obstructive sleep apnea) G47.33    Severe obesity with body mass index (BMI) of 35.0 to 39.9 with serious comorbidity (HCC) E66.01    Cyst of left sphenoid sinus J34.1    Abnormal liver function R94.5    Maxillary sinus cyst J34.1    Chronic kidney disease N18.9    Blurry vision, right eye H53.8    Hypertension, essential, benign I10    Mixed hyperlipidemia E78.2    Adenomatous polyp of ascending colon D12.2    Colon cancer screening Z12.11    BMI 38.0-38.9,adult Z68.38    Non-restorative sleep G47.8    Poor sleep hygiene Z72.821       Past Medical History:        Diagnosis Date    Dyslipidemia 5/8/2014    Family history of prostate cancer 5/8/2014    Fracture of right wrist 1979    HTN (hypertension) 5/8/2014    Hyperlipidemia     QUIRINO (obstructive sleep apnea) 5/8/2014    cpap    Prolonged emergence from general anesthesia        Past Surgical History:        Procedure Laterality Date    COLONOSCOPY N/A 5/2/2019    COLONOSCOPY/ 37 performed by Tammy Barnes MD at [facility] ENDOSCOPY    COLONOSCOPY N/A 11/8/2018    COLONOSCOPY / BMI=36 performed by Tammy Barnes MD at [facility] ENDOSCOPY       Social History:    Social History     Tobacco Use    Smoking status: Never    Smokeless tobacco: Never   Substance Use Topics    Alcohol use:  No                                Counseling given: Not Answered      Vital Signs (Current):   Vitals:    01/18/23 1509 01/26/23 0847   BP:  (!) 175/89   Pulse:  79   Resp:  16   Temp:  98.3 °F (36.8 °C)   TempSrc:  Oral   SpO2:  98%   Weight: 250 lb (113.4 kg) 250 lb (113.4 kg)   Height: 5' 8\" (1.727 m) 5' 8\" (1.727 m)                                              BP Readings from Last 3 Encounters:   01/26/23 (!) 175/89   12/09/22 138/88   10/19/22 (!) 140/92       NPO Status: Time of last liquid consumption: 1930                        Time of last solid consumption: 0900                        Date of last liquid consumption: 01/25/23                        Date of last solid food consumption: 01/25/23    BMI:   Wt Readings from Last 3 Encounters:   01/26/23 250 lb (113.4 kg)   12/09/22 253 lb (114.8 kg)   10/19/22 249 lb (112.9 kg)     Body mass index is 38.01 kg/m². CBC:   Lab Results   Component Value Date/Time    WBC 4.3 12/09/2022 08:38 AM    RBC 5.88 12/09/2022 08:38 AM    HGB 16.9 12/09/2022 08:38 AM    HCT 49.6 12/09/2022 08:38 AM    MCV 84.4 12/09/2022 08:38 AM    RDW 12.5 12/09/2022 08:38 AM     12/09/2022 08:38 AM       CMP:   Lab Results   Component Value Date/Time     12/09/2022 08:38 AM    K 3.7 12/09/2022 08:38 AM     12/09/2022 08:38 AM    CO2 27 12/09/2022 08:38 AM    BUN 10 12/09/2022 08:38 AM    CREATININE 1.20 12/09/2022 08:38 AM    GFRAA >60 06/15/2022 11:38 AM    AGRATIO 2.0 12/10/2021 08:50 AM    LABGLOM >60 12/09/2022 08:38 AM    GLUCOSE 117 12/09/2022 08:38 AM    PROT 7.3 12/09/2022 08:38 AM    CALCIUM 9.2 12/09/2022 08:38 AM    BILITOT 0.7 12/09/2022 08:38 AM    ALKPHOS 63 12/09/2022 08:38 AM    ALKPHOS 71 12/10/2021 08:50 AM    AST 31 12/09/2022 08:38 AM    ALT 50 12/09/2022 08:38 AM       POC Tests: No results for input(s): POCGLU, POCNA, POCK, POCCL, POCBUN, POCHEMO, POCHCT in the last 72 hours.     Coags: No results found for: PROTIME, INR, APTT    HCG (If Applicable): No results found for: PREGTESTUR, PREGSERUM, HCG, HCGQUANT     ABGs: No results found for: PHART, PO2ART, NVK5ASH, GCD0JMP, BEART, B5YCJFWT     Type & Screen (If Applicable):  No results found for: LABABO, LABRH    Drug/Infectious Status (If Applicable):  Lab Results   Component Value Date/Time    HEPCAB NONREACTIVE 12/09/2022 08:38 AM       COVID-19 Screening (If Applicable): No results found for: COVID19        Anesthesia Evaluation  Patient summary reviewed and Nursing notes reviewed  Airway: Mallampati: I  TM distance: >3 FB   Neck ROM: full  Mouth opening: > = 3 FB   Dental: normal exam         Pulmonary:Negative Pulmonary ROS breath sounds clear to auscultation  (+) sleep apnea: on CPAP,                             Cardiovascular:  Exercise tolerance: good (>4 METS),   (+) hypertension:,         Rhythm: regular  Rate: normal                    Neuro/Psych:   Negative Neuro/Psych ROS              GI/Hepatic/Renal:   (+) renal disease: CRI,           Endo/Other: Negative Endo/Other ROS                    Abdominal:             Vascular: negative vascular ROS. Other Findings:           Anesthesia Plan      TIVA     ASA 3       Induction: intravenous. Anesthetic plan and risks discussed with patient.                         Suyapa Pérez MD   1/26/2023 +tachypneic with suprasternal and subcostal retractions. inspiratory and expiratory wheezing. mild nasal flaring.

## 2023-01-27 PROBLEM — K63.5 POLYP OF DESCENDING COLON: Status: ACTIVE | Noted: 2023-01-27

## 2023-01-27 PROBLEM — K63.5 POLYP OF SIGMOID COLON: Status: ACTIVE | Noted: 2023-01-27

## 2023-01-27 NOTE — OP NOTE
Operative Note      Patient: Harry Cooper  YOB: 1970  MRN: [id number]    Date of Procedure: 1/26/2023      [address] [facility]  [phone]    Colonoscopy Procedure Note    Name: Harry Cooper     Date: 1/26/2023   Med Record Number: [id number]   Age: 47 y.o. Sex: male   Procedure: Colonoscopy with polypectomy (snare cautery)  Pre-operative Diagnosis:  High-risk Screening, Family history of colon or rectal polyps, and History of polyps  Post-operative Diagnosis: Colonic polyps (splenic flexure, sigmoid), tortuous colon  Indications: previous adenomatous polyp  Anesthesia/Sedation: MAC IV MAC anesthesia Propofol  Procedure Details:    Informed consent was obtained for the procedure, including sedation. Risks of perforation, hemorrhage, adverse drug reaction and aspiration were discussed. The patient was placed in the left lateral decubitus position. Based on the pre-procedure assessment, including review of the patient's medical history, medications, allergies, and review of systems, he had been deemed to be an appropriate candidate for sedation by the Anesthesia Dept. The patient was monitored continuously with ECG tracing, pulse oximetry, blood pressure monitoring, and direct observations. A time out was performed. Once sedation was adequate, a rectal examination was performed. The [id number] was inserted into the rectum and advanced under direct vision to the terminal ileum. The quality of the colonic preparation was adequate. A careful inspection was made as the colonoscope was withdrawn, including a retroflexed view of the rectum; findings and interventions are described below. Appropriate photodocumentation was obtained. Findings: ANUS: Anal exam reveals no masses or hemorrhoids, sphincter tone is normal.   RECTUM: Rectal exam reveals no masses or hemorrhoids.    SIGMOID COLON: The mucosa is normal with good vascular pattern and without ulcers and diverticula. -Protruding lesions:     -Pedunculated Polyp(s) size 5 mm removed by polypectomy (snare cautery)   DESCENDING COLON: The mucosa is normal with good vascular pattern and without ulcers and diverticula. -Protruding lesions within splenic flexure:     -Pedunculated Polyp(s) size 8 mm removed by polypectomy (snare cautery)  TRANSVERSE COLON: The mucosa is normal with good vascular pattern and without ulcers, diverticula, and polyps. ASCENDING COLON: The mucosa is normal with good vascular pattern and without ulcers, diverticula, and polyps. CECUM: The appendiceal orifice appears normal. The ileocecal valve appears normal.   TERMINAL ILEUM: The terminal ileum was normal.     Specimens:   ID Type Source Tests Collected by Time Destination   A : splenic flexure colon polyp Tissue Colon SURGICAL PATHOLOGY Kimber Rene MD 1/26/2023 0678    B : sigmoid colon polyp Tissue Sigmoid Colon SURGICAL PATHOLOGY Kimber Rene MD 1/26/2023 0079      Estimated Blood Loss:  0-minimum           Complications: None; patient tolerated the procedure well. Attending Attestation: I performed the procedure.     Impression:  See post-procedure diagnoses    Recommendations:-Await pathology, -Follow-up with me, and -Post polypectomy precautions    Kimber Rene MD, FACS

## 2023-01-27 NOTE — ANESTHESIA POSTPROCEDURE EVALUATION
Department of Anesthesiology  Postprocedure Note    Patient: Casey Hillman  MRN: [id number]  YOB: 1970  Date of evaluation: 1/26/2023      Procedure Summary     Date: 01/26/23 Room / Location: [facility] ENDO 04 / [facility] ENDOSCOPY    Anesthesia Start: 1023 Anesthesia Stop: 5975    Procedure: COLONOSCOPY POLYPECTOMY REMOVAL SNARE Diagnosis:       Screen for colon cancer      (Screen for colon cancer [Z12.11])    Surgeons: Pita Stein MD Responsible Provider: Dorian Brown MD    Anesthesia Type: TIVA ASA Status: 3          Anesthesia Type: No value filed.     Piotr Phase I: Piotr Score: 10    Piotr Phase II: Piotr Score: 10      Anesthesia Post Evaluation    Patient location during evaluation: PACU  Patient participation: complete - patient participated  Level of consciousness: awake and alert  Airway patency: patent  Nausea & Vomiting: no nausea and no vomiting  Complications: no  Cardiovascular status: hemodynamically stable  Respiratory status: acceptable, nonlabored ventilation and spontaneous ventilation  Hydration status: euvolemic  Comments: BP (!) 155/88   Pulse 80   Temp 97.7 °F (36.5 °C)   Resp 20   Ht 5' 8\" (1.727 m)   Wt 250 lb (113.4 kg)   SpO2 100%   BMI 38.01 kg/m²     Multimodal analgesia pain management approach

## 2023-02-13 NOTE — PROGRESS NOTES
[address] [place] [address] Court, [address] [facility]  [phone]    Patient Name:  Janeth Silveira  YOB: 1970      Office Visit 2/15/2023    CHIEF COMPLAINT:    Chief Complaint   Patient presents with    Sleep Apnea    Sleep Study    Results    CPAP/BiPAP     New start/compliance check          HISTORY OF PRESENT ILLNESS:  Patient is a 48 yo male seen today for follow up of QUIRINO. Diagnostic sleep study on 11/20/2022 with an AHI of 46.5 and lowest saturation 81%. He is prescribed cpap therapy with a humidifier set at 9.6 cm with a full face mask. Most recent download reveals AHI on PAP therapy is 9.6, leak is 30.6 and the hourly usage is 6 hours 20 minutes nightly. The overall use is 354 hours with days greater than four hours at 55/59. The patient is compliant with the Pap therapy and is feeling better as a result. He reports he definitely feels better after being back on CPAP therapy and his blood pressures have been better controlled. He has been able to increase his sleep hours as advised at last visit. States that he did lose his sister to cancer about 5-6 months ago and was previously only getting about 4 hours of sleep per night due to trying to care for her. He states daytime sleepiness has definitely improved and he feels more refreshed upon awakening in the morning. His AHI is still slightly elevated at 9.6. He reports that he thought his pressure may be set too low because he can remember being at higher pressures when he first started CPAP years ago but he was also overweight. States that he is currently back up to the weight he was when he was originally diagnosed and may need higher CPAP pressure. He denies any major medical changes over the last 6 months. Does report that his weight has increased but he intends on decreasing carbohydrates in his diet and increasing his activity level by walking. His blood pressure is controlled today.       Sleep Medicine 2/15/2023   Sitting and reading 0   Watching TV 1   Sitting, inactive in a public place (e.g. a theatre or a meeting) 1   As a passenger in a car for an hour without a break 1   Lying down to rest in the afternoon when circumstances permit 2   Sitting and talking to someone 0   Sitting quietly after a lunch without alcohol 1   In a car, while stopped for a few minutes in traffic 0   [place] Sleepiness Score 6       PSG 11/20/22      Sleep Medicine 2/15/2023   Sitting and reading 0   Watching TV 1   Sitting, inactive in a public place (e.g. a theatre or a meeting) 1   As a passenger in a car for an hour without a break 1   Lying down to rest in the afternoon when circumstances permit 2   Sitting and talking to someone 0   Sitting quietly after a lunch without alcohol 1   In a car, while stopped for a few minutes in traffic 0   [place] Sleepiness Score 6          Past Medical History:   Diagnosis Date    Dyslipidemia 5/8/2014    Family history of prostate cancer 5/8/2014    Fracture of right wrist 1979    HTN (hypertension) 5/8/2014    Hyperlipidemia     QUIRINO (obstructive sleep apnea) 5/8/2014    cpap    Prolonged emergence from general anesthesia          Patient Active Problem List   Diagnosis    BMI 34.0-34.9,adult    BMI 36.0-36.9,adult    Family history of colon cancer    Family history of prostate cancer    Prostate cancer screening    Swelling of left side of face    Dyslipidemia    Environmental and seasonal allergies    BMI 35.0-35.9,adult    Allergic rhinitis due to pollen    Obesity (BMI 30-39.9)    Bee sting reaction    FH: colon polyps    QUIRINO (obstructive sleep apnea)    Class 3 severe obesity due to excess calories without serious comorbidity with body mass index (BMI) of 40.0 to 44.9 in adult (HCC)    Cyst of left sphenoid sinus    Abnormal liver function    Maxillary sinus cyst    Chronic kidney disease    Blurry vision, right eye    Hypertension, essential, benign    Mixed hyperlipidemia     Adenomatous polyp of ascending colon    Colon cancer screening    BMI 38.0-38.9,adult    Non-restorative sleep    Poor sleep hygiene    Polyp of descending colon    Polyp of sigmoid colon    Hx of adenomatous colonic polyps          Past Surgical History:   Procedure Laterality Date    COLONOSCOPY N/A 5/2/2019    COLONOSCOPY/ 40 performed by Valentine Crowley MD at [facility] ENDOSCOPY    COLONOSCOPY N/A 11/8/2018    COLONOSCOPY / BMI=36 performed by Valentine Crowley MD at [facility] ENDOSCOPY    COLONOSCOPY N/A 1/26/2023    COLONOSCOPY POLYPECTOMY REMOVAL SNARE performed by Peña Hutchison MD at [facility] ENDOSCOPY           Social History     Socioeconomic History    Marital status: Single     Spouse name: Not on file    Number of children: 0    Years of education: Not on file    Highest education level: Not on file   Occupational History    Occupation:      Comment: [employer]   Tobacco Use    Smoking status: Never    Smokeless tobacco: Never   Vaping Use    Vaping Use: Never used   Substance and Sexual Activity    Alcohol use: No    Drug use: No    Sexual activity: Not Currently     Partners: Female   Other Topics Concern    Not on file   Social History Narrative    Not on file     Social Determinants of Health     Financial Resource Strain: Not on file   Food Insecurity: Not on file   Transportation Needs: Not on file   Physical Activity: Not on file   Stress: Not on file   Social Connections: Not on file   Intimate Partner Violence: Not on file   Housing Stability: Not on file         Family History   Problem Relation Age of Onset    Hypertension Mother         ESRD    Diabetes Father     Diabetes Sister         s/p amputation    Uterine Cancer Sister 64    Hypertension Sister     Hypertension Brother     Cancer Paternal Grandfather 80        colon cancer    Cancer Maternal Uncle 72        prostate cancer    Prostate Cancer Paternal Uncle         in 3 uncles         Allergies   Allergen Reactions    Bee Venom Anaphylaxis         Current Outpatient Medications   Medication Sig    atorvastatin (LIPITOR) 10 MG tablet Take 1 tablet by mouth Daily with lunch    benazepril (LOTENSIN) 20 MG tablet Take 1 tablet by mouth daily TAKE 1 TAB BY MOUTH DAILY. fluticasone (FLONASE) 50 MCG/ACT nasal spray 2 sprays by Nasal route daily    Melatonin 5 MG CAPS Take 5 mg by mouth at bedtime    cetirizine (ZYRTEC) 10 MG tablet Take 1 tablet by mouth daily     No current facility-administered medications for this visit. REVIEW OF SYSTEMS:   CONSTITUTIONAL:   There is no history of fever, chills, night sweats, weight loss, weight gain, persistent fatigue, or lethargy/hypersomnolence. CARDIAC:   No chest pain, pressure, discomfort, palpitations, orthopnea, murmurs, or edema. GI:   No dysphagia, heartburn reflux, nausea/vomiting, diarrhea, abdominal pain, or bleeding. NEURO:   There is no history of AMS, persistent headache, decreased level of consciousness, seizures, or motor or sensory deficits. PHYSICAL EXAM:    Vitals:    02/15/23 1106   BP: 131/81   Pulse: 73   Resp: 18   Temp: 96.9 °F (36.1 °C)   SpO2: 96%   Weight: 252 lb 4.8 oz (114.4 kg)   Height: 5' 8\" (1.727 m)        Body mass index is 38.36 kg/m². GENERAL APPEARANCE:   The patient is obese and in no respiratory distress. HEENT:   PERRL. Conjunctivae unremarkable. Nasal mucosa is without epistaxis, exudate, or polyps. Nares patent bilateral.  Gums and dentition are unremarkable. There is oropharyngeal narrowing. Flores score 3. NECK/LYMPHATIC:   Symmetrical with no elevation of jugular venous pulsation. Trachea midline. No thyroid enlargement. No cervical adenopathy. LUNGS:   Normal respiratory effort with symmetrical lung expansion. Breath sounds clear. HEART:   There is a regular rate and rhythm. No murmur, rub, or gallop. There is no edema in the lower extremities. ABDOMEN:   Soft and non-tender. No hepatosplenomegaly. Bowel sounds are normal.     NEURO:   The patient is alert and oriented to person, place, and time. Memory appears intact and mood is normal.  No gross sensorimotor deficits are present. ASSESSMENT:  (Medical Decision Making)       ICD-10-CM    1. QUIRINO (obstructive sleep apnea)  G47.33 DME - DURABLE MEDICAL EQUIPMENT - Patient is using, compliant and benefiting from Pap therapy. AHI is down to 9.6 events per hour. We will change patient's CPAP settings to an auto setting at 12-16 cm H2O.      2. Non-restorative sleep  G47.8 Improved with CPAP therapy      3. Poor sleep hygiene  C77.104 Patient has really worked on his sleep schedule to allow for more hours of sleep and is averaging greater than 6 hours of sleep currently. He was congratulated on this accomplishment. 4. Class 3 severe obesity due to excess calories without serious comorbidity with body mass index (BMI) of 40.0 to 44.9 in adult [facility])  E66.01 Patient can lose weight including ambulating 8-10,000 steps. Can Build Up Slowly as tolerated to this goal.    Also modifying dietary intake with decrease carbohydrates and sweets. Told to use avoid the P's --> Pizza, Pasta, Pastry, etc.  Increase fruits, vegetables, and lean meats e.g. [place] chicken or game meat. Patient is aware the goal is to consume less than 2000 marium/day, since patient is a nondiabetic. We discussed using the Carmela LOSE IT to count calories. Patient can police themselves. If the future, if still having issues can use a more regimented diet e.g. [place], [address] etc. Clinical correlation suggested.      Z68.41             PLAN:    Continue CPAP at new auto setting of 12-16 cm H2O with nightly compliance  No supplies ordered  Recommendations as above  Follow-up in 3 months or sooner if needed    Orders Placed This Encounter   Procedures    DME - [address] [place]  Phone: [address]  [address] [place] [address]  Dept: [phone]      Patient Name: Casey Hillman  : 1970  Gender: male  Address: [address] [address]  Patient phone: [phone] (home)       Primary Insurance: Payor: John Evangelista / Plan: Fartun Dunawayor / Product Type: *No Product type* /   Subscriber ID: [id number] - ([facility])      [facility] Supply Order  Order Details     DME Location: [facility]   Order Date: 2/15/2023   The primary encounter diagnosis was QUIRINO (obstructive sleep apnea). Diagnoses of Non-restorative sleep, Poor sleep hygiene, and Class 3 severe obesity due to excess calories without serious comorbidity with body mass index (BMI) of 40.0 to 44.9 in [facility]) were also pertinent to this visit.              (  X   )Supplies Needed       CPAP Machine   (     ) CPAP Unit  ( x    ) Auto CPAP Unit  (     ) BiLevel Unit  (     ) Auto BiLevel Unit  (     ) ASV   (     ) Bilevel ST      Length of need: 12 months    Pressure:  12-16 cmH20  EPR: 3     Starting Ramp Pressure:   cm H20  Ramp Time: min      Patient had a diagnostic Apnea Hypopnea Index (AHI) of :  46.5  *SUPPLIES* Replace all as needed, or per coverage guidelines     Masks Type:  ( x   ) -Full Face Mask (1 per 3 mon)  (  x  ) -Full Mask (1 per month) Interface/Cushion      (  ) -Nasal Mask (1 per 3 mon)  (  ) - Nasal Mask (1 per month) Interface/Cushion  (     ) -Pillow (2 per mon)  (     ) -Bvoftcejc (1 per 6 mon)            Other Supplies:    (   X  )-Cmyaqyva (1 per 6 mon)  (   X  )-Pavgtd Tubing (1 per 3 mon)  (   X  )- Disposable Filter (2 per mon)  (   x  )-Hrwepb Humidifier (1 per year)     ( x    )-Szfupnihd (sometimes used with Full Face Mask) (1 per 6 mos)  (    )-Tubing-without heat (1 per 3 mos)  (     )-Non-Disposable Filter (1 per 6 mos)  (  x   )-Water Chamber (1 per 6 mos)  (     )-Humidifier non-heated (1 per 5 yrs)      Signed Date: 2/15/2023  Electronically Signed By: TINA Perez CNP  Electronically Dated:  2/15/2023               Collaborating Physician: Dr. Randy Farrell    Over 50% of today's office visit was spent in face to face time reviewing test results, prognosis, importance of compliance, education about disease process, benefits of medications, instructions for management of acute flare-ups, and follow up plans. Total face to face time spent with patient was 20 minutes.         TINA Perez CNP  Electronically signed

## 2023-02-14 ENCOUNTER — OFFICE VISIT (OUTPATIENT)
Dept: SURGERY | Age: 53
End: 2023-02-14
Payer: COMMERCIAL

## 2023-02-14 VITALS
BODY MASS INDEX: 37.89 KG/M2 | HEART RATE: 95 BPM | WEIGHT: 250 LBS | DIASTOLIC BLOOD PRESSURE: 80 MMHG | OXYGEN SATURATION: 97 % | SYSTOLIC BLOOD PRESSURE: 138 MMHG | HEIGHT: 68 IN

## 2023-02-14 DIAGNOSIS — E66.01 SEVERE OBESITY WITH BODY MASS INDEX (BMI) OF 35.0 TO 39.9 WITH SERIOUS COMORBIDITY (HCC): ICD-10-CM

## 2023-02-14 DIAGNOSIS — Z86.010 HX OF ADENOMATOUS COLONIC POLYPS: ICD-10-CM

## 2023-02-14 DIAGNOSIS — D12.4 ADENOMATOUS POLYP OF DESCENDING COLON: Primary | ICD-10-CM

## 2023-02-14 DIAGNOSIS — D12.5 ADENOMATOUS POLYP OF SIGMOID COLON: ICD-10-CM

## 2023-02-14 PROBLEM — Z86.0101 HX OF ADENOMATOUS COLONIC POLYPS: Status: ACTIVE | Noted: 2023-02-14

## 2023-02-14 PROCEDURE — 3075F SYST BP GE 130 - 139MM HG: CPT | Performed by: SURGERY

## 2023-02-14 PROCEDURE — 3079F DIAST BP 80-89 MM HG: CPT | Performed by: SURGERY

## 2023-02-14 PROCEDURE — 99213 OFFICE O/P EST LOW 20 MIN: CPT | Performed by: SURGERY

## 2023-02-14 NOTE — PROGRESS NOTES
[address] [place] [address]  [phone]    Office Note   Yasmany Khan   MRN: [id number]     : 1970        HPI: Yasmany Khan is a 47 y.o. male who presents to the office on 2023, following recent surveillance colonoscopy. He had previous adenomatous polyps including tubulovillous adenomas and a family history of colon cancer. He also has family history of colon polyps. 2 polyps were removed from the left side of the colon that both returned as tubular adenomas. Neither polyp was greater than 10 mm. His path was discussed and he was given a copy of the report with the date  on it to help serve as a reminder for a 5-year surveillance interval recommendation. His prior colonoscopy was 3-1/2 years ago. PATHOLOGY DIAGNOSIS        A:  \"SPLENIC FLEXURE COLON POLYP\":  FRAGMENTS OF TUBULAR ADENOMA. B:  \"SIGMOID COLON POLYP\":  FRAGMENTS OF TUBULAR ADENOMA. Sign Out Date: 2023  CHRSI Norwood M.D    In 2019, he returns to the office following his initial screening colonoscopy. He was high risk screening due to family history. He had significant findings. He had 3 significant polyps removed from the ascending colon. One was in the cecum, one was in the distal ascending colon and one was in the hepatic flexure. All 3 of these polyps were adenomas. Greater than 10 mm polyp in the distal ascending colon was a tubulovillous adenoma. The other 2 polyps were tubular adenomas. Patient completed a 1 day prep as directed. Unfortunately this did not completely clear out his colon for complete visualization of the left side of his colon. From the splenic flexure to the mid descending colon, 30% about lumen cannot be cleared. Given his significant histology, I am recommending repeat colonoscopy in 6 months.  We can have a short interval follow-up of the polyps that were removed as well as to clear the remaining portion of the unseen colon. We will recommend a 2-day prep. He otherwise tolerated the colonoscopy procedure well. I gave him copy of his pathology report and explained the findings. He had his second colonoscopy in May 2019. He had an excellent result from the 2-day prep. Additional small polyps were removed from the rectum and sigmoid colon. The sigmoid polyp was hyperplastic but the rectal polyp was a tubulovillous adenoma. Surveillance recommendation is made for 3 years in 2022. He was referred by Dr. Amber Da Silva for initial colonoscopy. The patient is of above average risk for colon cancer. Patient has a positive family history of colon cancer in his paternal grandfather who was diagnosed in his [de-identified]. He has uncles on both sides of the family that have had colon polyps. He has several brothers that have colon polyps since their 45s. He believes that his father also has had colon polyps. He has no GI complaints. He has normal daily to 2 times a day bowel movements. He denies any blood, mucus or change in caliber of stools. He denies any prior abdominal or perianal surgeries. He has never had any therapeutic radiation.        Past Medical History:   Diagnosis Date    Dyslipidemia 5/8/2014    Family history of prostate cancer 5/8/2014    Fracture of right wrist 1979    HTN (hypertension) 5/8/2014    Hyperlipidemia     QUIRINO (obstructive sleep apnea) 5/8/2014    cpap    Prolonged emergence from general anesthesia      Past Surgical History:   Procedure Laterality Date    COLONOSCOPY N/A 5/2/2019    COLONOSCOPY/ 37 performed by Karina Bedolla MD at [facility] ENDOSCOPY    COLONOSCOPY N/A 11/8/2018    COLONOSCOPY / BMI=36 performed by Karina Bedolla MD at [facility] ENDOSCOPY    COLONOSCOPY N/A 1/26/2023    COLONOSCOPY POLYPECTOMY REMOVAL SNARE performed by José Miguel Wilcox MD at [facility] ENDOSCOPY     Current Outpatient Medications   Medication Sig    atorvastatin (LIPITOR) 10 MG tablet Take 1 tablet by mouth Daily with lunch    benazepril (LOTENSIN) 20 MG tablet Take 1 tablet by mouth daily TAKE 1 TAB BY MOUTH DAILY. fluticasone (FLONASE) 50 MCG/ACT nasal spray 2 sprays by Nasal route daily    Melatonin 5 MG CAPS Take 5 mg by mouth at bedtime    cetirizine (ZYRTEC) 10 MG tablet Take 1 tablet by mouth daily     No current facility-administered medications for this visit. ALLERGIES:  Bee venom    Social History     Socioeconomic History    Marital status: Single     Spouse name: None    Number of children: 0    Years of education: None    Highest education level: None   Occupational History    Occupation:      Comment: [employer]ing [employer]   Tobacco Use    Smoking status: Never    Smokeless tobacco: Never   Vaping Use    Vaping Use: Never used   Substance and Sexual Activity    Alcohol use: No    Drug use: No    Sexual activity: Not Currently     Partners: Female     Social History     Tobacco Use   Smoking Status Never   Smokeless Tobacco Never     Family History   Problem Relation Age of Onset    Hypertension Mother         ESRD    Diabetes Father     Diabetes Sister         s/p amputation    Uterine Cancer Sister 64    Hypertension Sister     Hypertension Brother     Cancer Paternal Grandfather 80        colon cancer    Cancer Maternal Uncle 72        prostate cancer    Prostate Cancer Paternal Uncle         in 3 uncles       ROS: The patient has no difficulty with chest pain or shortness of breath. No fever or chills. The patient denies any personal or family history of abnormal clotting or bleeding. Comprehensive review of systems was otherwise unremarkable except as noted above. Physical Exam:   Constitutional: Alert oriented cooperative patient in no acute distress. /80   Pulse 95   Ht 5' 8\" (1.727 m)   Wt 250 lb (113.4 kg)   SpO2 97%   BMI 38.01 kg/m²   Eyes:Sclera are clear without icterus. ENMT: no obvious neck masses, no ear or lip lesions  CV: RRR.  Normal perfusion  Resp: No JVD.  Breathing is  non-labored. GI: soft and non-distended     Musculoskeletal: unremarkable with normal function. Neuro:  No obvious focal deficits  Psychiatric: normal affect and mood, no memory impairment    Lab Results   Component Value Date/Time    WBC 4.3 12/09/2022 08:38 AM    HGB 16.9 12/09/2022 08:38 AM    HCT 49.6 12/09/2022 08:38 AM     12/09/2022 08:38 AM    MCV 84.4 12/09/2022 08:38 AM       Lab Results   Component Value Date/Time     12/09/2022 08:38 AM    K 3.7 12/09/2022 08:38 AM     12/09/2022 08:38 AM    CO2 27 12/09/2022 08:38 AM    BUN 10 12/09/2022 08:38 AM    ALT 50 12/09/2022 08:38 AM       Assessment/Plan:     Pilar Tejada is a 47 y.o. male who has significant findings on his initial colonoscopy. He had 3 adenomatous polyps on the right colon. He had incomplete visualization of the left colon and will require 2-day prep for next colonoscopy. We discussed short interval colonoscopy in 6 months. The colonoscopy also identified and removed polyps. A tubulovillous adenoma was present in the rectum and removed. Other polyps were hyperplastic. 3-year interval recommendation for 2022 was recommended. He had an excellent prep with the 2-day prep. We fashioned a 2-day prep. He should do well with the adult colonoscope. He understands that if his colonoscopy is negative at this next session then we will recommend an interval surveillance of 3 years form the original colonoscopy (2022). He now presents for his 3-year surveillance colonoscopy which he had on 1/26/2023.  2 polyps were removed and have returned as tubular adenomas. He had no issues following colonoscopy. He returns the office on 2/14/2023, and his path was discussed. Risk profile of his polyps have decreased from tubulovillous adenomas 2 tubular adenomas. Recommendation for 5-year surveillance interval was made and the health maintenance record is adjusted.   He understands that if he develops symptoms that we will see him sooner otherwise we will see him back in 5 years. Patient Active Problem List    Diagnosis Date Noted    Hx of adenomatous colonic polyps 02/14/2023     Priority: Medium     Short Interval Completion Colonoscopy 5/2/2019  DIAGNOSIS       A:  \"SIGMOID POLYP\":  FRAGMENT OF HYPERPLASTIC POLYP. B:  \"RECTAL POLYP\":  FRAGMENT OF MIXED TUBULOVILLOUS ADENOMA. Sign Out Date: 5/3/2019  CHRIS Victoria M.D. Polyp of descending colon 01/27/2023     Priority: Medium     Colonoscopy 1/26/2023  DIAGNOSIS        A:  \"SPLENIC FLEXURE COLON POLYP\":  FRAGMENTS OF TUBULAR ADENOMA. B:  \"SIGMOID COLON POLYP\":  FRAGMENTS OF TUBULAR ADENOMA. Sign Out Date: 1/27/2023  CHRIS Victoria M.D      Polyp of sigmoid colon 01/27/2023     Priority: Medium    Non-restorative sleep 10/19/2022     Priority: Medium    Poor sleep hygiene 10/19/2022     Priority: Medium    BMI 38.0-38.9,adult 06/15/2022     Priority: Medium    Allergic rhinitis due to pollen 06/11/2020    Adenomatous polyp of ascending colon 12/07/2018     Initial colonoscopy 11/8/2018  DIAGNOSIS   A: CECAL POLYP: FRAGMENTS OF TUBULAR ADENOMA. B: ASCENDING COLON POLYP: FRAGMENTS OF MIXED TUBULOVILLOUS ADENOMA. C: HEPATIC FLEXURE POLYP: FRAGMENT OF TUBULAR ADENOMA. Electronically signed out on 11/9/2018 10:16 by CHRIS Victoria M.D.          Bee sting reaction 11/19/2018    FH: colon polyps 07/17/2018    Severe obesity with body mass index (BMI) of 35.0 to 39.9 with serious comorbidity ([address] 07/17/2018    Family history of colon cancer 06/19/2018    Prostate cancer screening 06/19/2018    Colon cancer screening 06/19/2018    Mixed hyperlipidemia 11/16/2017    BMI 36.0-36.9,adult 05/24/2017    Cyst of left sphenoid sinus 04/26/2017    Maxillary sinus cyst 04/26/2017    Abnormal liver function 04/18/2017    Swelling of left side of face 04/03/2017    Environmental and seasonal allergies 03/20/2017 BMI 35.0-35.9,adult 12/30/2016    Chronic kidney disease 12/30/2016    Blurry vision, right eye 12/30/2016    BMI 34.0-34.9,adult 11/10/2016    Obesity (BMI 30-39.9) 11/10/2016    Hypertension, essential, benign 07/28/2014    Family history of prostate cancer 05/08/2014    Dyslipidemia 05/08/2014    QUIRINO (obstructive sleep apnea) 05/08/2014        Level of MDM (Based on 2/3 elements below)  Number and Complexity of Problems Addressed Amount and/or Complexity of Data to be Reviewed and Analyzed  *Each unique test, order, or document contributes to the combination of 2 or combination of 3 in Category 1 below. Risk of Complications and/or Morbidity or Mortality of pt Management     08373  84205 SF Minimal  ?1self-limited or minor problem Minimal or none Minimal risk of morbidity from additional diagnostic testing or Rx   03276  71876 Low Low  ? 2or more self-limited or minor problems;    or  ? 1stable chronic illness;    or  ?1acute, uncomplicated illness or injury   Limited  (Must meet the requirements of at least 1 of the 2 categories)  Category 1: Tests and documents   ? Any combination of 2 from the following:  ?Review of prior external note(s) from each unique source*;  ?review of the result(s) of each unique test*;   ?ordering of each unique test*    or   Category 2: Assessment requiring an independent historian(s)  (For the categories of independent interpretation of tests and discussion of management or test interpretation, see moderate or high) Low risk of morbidity from additional diagnostic testing or treatment     45368  04499 Mod Moderate  ? 1or more chronic illnesses with exacerbation, progression, or side effects of treatment;    or  ?2or more stable chronic illnesses;    or      1undiagnosed new problem with uncertain prognosis;    or  ?1acute illness with systemic symptoms;    or  ?1acute complicated injury   Moderate:  (Must meet the requirements of 1 out of 3 categories)    Category 1: Tests, documents, or independent historian(s)  ? Any combination of 3 from the following:   ?Review of prior external note(s) from each unique source*;  ?Review of the result(s) of each unique test*;  ?Ordering of each unique test*;  ?Assessment requiring an independent historian(s)    or  Category 2: Independent interpretation of tests   ? Independent interpretation of a test performed by another physician/other qualified health care professional (not separately reported);     or  Category 3: Discussion of management or test interpretation  ? Discussion of management or test interpretation with external physician/other qualified health care professional/appropriate source (not separately reported)   Moderate risk of morbidity from additional diagnostic testing or treatment  Examples only: ? ? Prescription drug management - including advanced wound care prescription wound care products  (eg Iodosorb, hydrofera, silvadene, collagen, puracol plus, optiloc, biologics - placenta, Theraskin, Apligraf). Note also that if home health care is involved, they will not apply topical therapies without a prescription/order from physician    ? Decision regarding minor surgery with identified patient or procedure risk factors  ? Decision regarding elective major surgery without identified patient or procedure risk factors   ? Diagnosis or treatment significantly limited by social determinants of health       58400  57581 High High  ? 1or more chronic illnesses with severe exacerbation, progression, or side effects of treatment;    or  ?1 acute or chronic illness or injury that poses a threat to life or bodily function   Extensive  (Must meet the requirements of at least 2 out of 3 categories)    Category 1: Tests, documents, or independent historian(s)  ? Any combination of 3 from the following:   ?Review of prior external note(s) from each unique source*;  ?Review of the result(s) of each unique test*;   ?Ordering of each unique test*;   ?Assessment requiring an independent historian(s)    or   Category 2: Independent interpretation of tests   ? Independent interpretation of a test performed by another physician/other qualified health care professional (not separately reported);     or  Category 3: Discussion of management or test interpretation  ? Discussion of management or test interpretation with external physician/other qualified health care professional/appropriate source (not separately reported)   High risk of morbidity from additional diagnostic testing or treatment  Examples only:  ?Drug therapy requiring intensive monitoring for toxicity  ? Decision regarding elective major surgery with identified patient or procedure risk factors  ? Decision regarding emergency major surgery  ? Decision regarding hospitalization  ? Decision not to resuscitate or to de-escalate care because of poor prognosis      Parenteral controlled substances             Maverick Meyers MD,  FACS

## 2023-02-15 ENCOUNTER — OFFICE VISIT (OUTPATIENT)
Dept: SLEEP MEDICINE | Age: 53
End: 2023-02-15
Payer: COMMERCIAL

## 2023-02-15 VITALS
TEMPERATURE: 96.9 F | BODY MASS INDEX: 38.24 KG/M2 | HEART RATE: 73 BPM | SYSTOLIC BLOOD PRESSURE: 131 MMHG | DIASTOLIC BLOOD PRESSURE: 81 MMHG | OXYGEN SATURATION: 96 % | HEIGHT: 68 IN | WEIGHT: 252.3 LBS | RESPIRATION RATE: 18 BRPM

## 2023-02-15 DIAGNOSIS — Z72.821 POOR SLEEP HYGIENE: ICD-10-CM

## 2023-02-15 DIAGNOSIS — G47.33 OSA (OBSTRUCTIVE SLEEP APNEA): Primary | ICD-10-CM

## 2023-02-15 DIAGNOSIS — E66.01 CLASS 3 SEVERE OBESITY DUE TO EXCESS CALORIES WITHOUT SERIOUS COMORBIDITY WITH BODY MASS INDEX (BMI) OF 40.0 TO 44.9 IN ADULT (HCC): ICD-10-CM

## 2023-02-15 DIAGNOSIS — G47.8 NON-RESTORATIVE SLEEP: ICD-10-CM

## 2023-02-15 PROBLEM — E66.813 CLASS 3 SEVERE OBESITY DUE TO EXCESS CALORIES WITHOUT SERIOUS COMORBIDITY WITH BODY MASS INDEX (BMI) OF 40.0 TO 44.9 IN ADULT: Status: ACTIVE | Noted: 2018-07-17

## 2023-02-15 PROCEDURE — 3075F SYST BP GE 130 - 139MM HG: CPT | Performed by: NURSE PRACTITIONER

## 2023-02-15 PROCEDURE — 99213 OFFICE O/P EST LOW 20 MIN: CPT | Performed by: NURSE PRACTITIONER

## 2023-02-15 PROCEDURE — 3079F DIAST BP 80-89 MM HG: CPT | Performed by: NURSE PRACTITIONER

## 2023-02-15 ASSESSMENT — SLEEP AND FATIGUE QUESTIONNAIRES
HOW LIKELY ARE YOU TO NOD OFF OR FALL ASLEEP WHILE WATCHING TV: 1
HOW LIKELY ARE YOU TO NOD OFF OR FALL ASLEEP WHILE SITTING QUIETLY AFTER LUNCH WITHOUT ALCOHOL: 1
HOW LIKELY ARE YOU TO NOD OFF OR FALL ASLEEP WHILE SITTING AND READING: 0
ESS TOTAL SCORE: 6
HOW LIKELY ARE YOU TO NOD OFF OR FALL ASLEEP IN A CAR, WHILE STOPPED FOR A FEW MINUTES IN TRAFFIC: 0
HOW LIKELY ARE YOU TO NOD OFF OR FALL ASLEEP WHILE SITTING INACTIVE IN A PUBLIC PLACE: 1
HOW LIKELY ARE YOU TO NOD OFF OR FALL ASLEEP WHEN YOU ARE A PASSENGER IN A CAR FOR AN HOUR WITHOUT A BREAK: 1
HOW LIKELY ARE YOU TO NOD OFF OR FALL ASLEEP WHILE SITTING AND TALKING TO SOMEONE: 0
HOW LIKELY ARE YOU TO NOD OFF OR FALL ASLEEP WHILE LYING DOWN TO REST IN THE AFTERNOON WHEN CIRCUMSTANCES PERMIT: 2

## 2023-02-15 NOTE — PATIENT INSTRUCTIONS
Continue CPAP at new auto setting of 12-16 cm H2O with nightly compliance  No supplies ordered  Recommendations as above  Follow-up in 3 months or sooner if needed

## 2023-02-21 DIAGNOSIS — R97.20 ELEVATED PSA: Primary | ICD-10-CM

## 2023-02-21 NOTE — PROGRESS NOTES
[address] [facility]  [address] [facility]  [place], [address] [place] [address]  [phone]        Mr. Pasquale Acosta is a 47 y.o. male with a diagnosis of Elevated PSA. INTERVAL HISTORY: Patient is here today for follow-up of his elevated PSA. His PSA was 4.1 in February 2022. It then went down to 3.6 on 8/19/2022. It was rechecked today and was up to 6.4. That is as high as it has been. He has no significant lower urinary tract symptoms. He has had no UTI or prostatitis like symptoms. He does have a family history of prostate cancer in his uncles. Unfortunately his sister passed away 2 to 3 months ago from vaginal cancer. He has not undergone a biopsy of his prostate but did have a prostate MRI on 1/15/2021 which showed a 43 cc gland. There was a PI-RADS 2 lesion but no real worrisome areas seen on his MRI. From previous note:  Patient is here today for follow-up on his elevated PSA. His last PSA was 4.1 on 2/11/2022. He states he has been under quite a bit of stress recently as he unexpectedly lost his sister 3 weeks ago. He denies any change in his lower urinary tract symptoms he has not had any hematuria or dysuria. PATTI at his last appointment was normal.  His prostate MRI was 1/15/2021 and showed a 43 cc gland with a PI-RADS 2 area but no discrete lesion. He does have 3 uncles that have a history of prostate cancer. From previous note(s):  He returns today for follow-up. He has no complaints. His lower urinary tract symptoms have been stable. He denies any gross hematuria. His PSA was checked on 2/11/2022 and was 4.1. His previous 1 was 3.4 and the 1 prior to that was 3.4. He had 1 in December 2020 that was also 4.1. He underwent a prostate MRI on 1/15/2021 which showed a 43 cc gland. He had a PI-RADS 2 lesion but there was no discrete mass. Overall it was a relatively normal-appearing MRI.   Of note he does have a significant family history for prostate cancer. He works as a  for [employer] here in [employer]. elevated PSA. In December of last year was 4.1. He underwent an MRI of his prostate on 1/15/2021. It was relatively unremarkable he had a PI-RADS 2 lesion but on my review nothing terribly concerning on the MRI. We discussed at that time moving forward with a prostate biopsy versus surveillance and he prefers surveillance. Of note his digital rectal exam on 1/8/2021 was also unremarkable. Of note, he has 3 uncles that were diagnosed with prostate cancer. He has a father and her brother that do not have known prostate cancer. He has no significant lower urinary tract symptoms and no other complaints today. Past medical, family and social histories, as well as medications and allergies, were reviewed and updated in the medical record as appropriate. PMH:     Past Medical History:   Diagnosis Date    Dyslipidemia 5/8/2014    Family history of prostate cancer 5/8/2014    Fracture of right wrist 1979    HTN (hypertension) 5/8/2014    Hyperlipidemia     QUIRINO (obstructive sleep apnea) 5/8/2014    cpap    Prolonged emergence from general anesthesia        MEDs:     atorvastatin  benazepril  cetirizine  fluticasone  Melatonin Caps     ALLERGIES:    Allergies   Allergen Reactions    Bee Venom Anaphylaxis       ROS:     Review of Systems   Constitutional: Negative. Negative for chills, fatigue and fever. Respiratory: Negative. Cardiovascular: Negative. Gastrointestinal: Negative. Genitourinary: Negative. Negative for difficulty urinating, dysuria, flank pain, frequency, hematuria and urgency. Musculoskeletal: Negative. All other systems reviewed and are negative.      PHYSICAL EXAMINATION    BP (!) 144/99   Pulse 70   Temp 98.6 °F (37 °C) (Oral)   Resp 16   Ht 5' 8\" (1.727 m)   Wt 252 lb 8 oz (114.5 kg)   SpO2 97%   BMI 38.39 kg/m²   General: well dressed, well nourished, no acute distress  Skin: no rashes  HEENT: Sclera are clear,normocephalic, atraumatic. no external lesions   Cardiovascular: Reg. Normal perfusion  Respiratory: normal respiratory effort, no JVD, no audible wheezing. Musculoskeletal: unremarkable with normal function. No embolic signs or cyanosis. Neurologic exam: intact, no focal deficits, moves all 4 extremities  Psych: normal mood and affect, alert, oriented x 3  LE:  no edema  GI: soft, nontender, no masses, no CVA tenderness  : DEFERRED       LABORATORY RESULTS:    Lab Results   Component Value Date/Time    PSA 6.4 02/24/2023 10:53 AM    PSA 3.6 08/19/2022 09:48 AM    PSA 4.1 02/11/2022 11:49 AM    PSA 3.4 07/30/2021 08:46 AM          ASSESSMENT:    Mr. Carly Geller is a 47 y.o. male with a diagnosis of Elevated PSA. I am concerned about his continued elevation of his PSA. I recommended a repeat prostate MRI in the next 1 to 2 weeks. I then have also recommended we most likely move forward with a repeat prostate biopsy, either regular TRUS biopsy or MRI fusion if there is a lesion seen on his MRI. He plans to see me back for further discussion shortly after the MRI. PLAN:   -PSA and PATTI today  -repeat MRI pelv w wo cont  -rtc to review    ________________________________________      I have seen and examined this patient. I have reviewed and edited the note started by the MA and agree with the outlined plan. Part of this note was written by using a voice dictation software. The note has been proof read but may still contain some grammatical/other typographical errors.       Roberto Hussein  Urology

## 2023-02-24 ENCOUNTER — HOSPITAL ENCOUNTER (OUTPATIENT)
Dept: LAB | Age: 53
Discharge: HOME | End: 2023-02-24
Payer: COMMERCIAL

## 2023-02-24 ENCOUNTER — OFFICE VISIT (OUTPATIENT)
Dept: ONCOLOGY | Age: 53
End: 2023-02-24
Payer: COMMERCIAL

## 2023-02-24 VITALS
DIASTOLIC BLOOD PRESSURE: 99 MMHG | BODY MASS INDEX: 38.27 KG/M2 | HEART RATE: 70 BPM | TEMPERATURE: 98.6 F | WEIGHT: 252.5 LBS | HEIGHT: 68 IN | OXYGEN SATURATION: 97 % | RESPIRATION RATE: 16 BRPM | SYSTOLIC BLOOD PRESSURE: 144 MMHG

## 2023-02-24 DIAGNOSIS — R97.20 ELEVATED PSA: ICD-10-CM

## 2023-02-24 DIAGNOSIS — R97.20 ELEVATED PROSTATE SPECIFIC ANTIGEN (PSA): Primary | ICD-10-CM

## 2023-02-24 LAB — PSA SERPL-MCNC: 6.4 NG/ML

## 2023-02-24 PROCEDURE — 3074F SYST BP LT 130 MM HG: CPT | Performed by: UROLOGY

## 2023-02-24 PROCEDURE — 36415 COLL VENOUS BLD VENIPUNCTURE: CPT

## 2023-02-24 PROCEDURE — 84153 ASSAY OF PSA TOTAL: CPT

## 2023-02-24 PROCEDURE — 3078F DIAST BP <80 MM HG: CPT | Performed by: UROLOGY

## 2023-02-24 PROCEDURE — 99213 OFFICE O/P EST LOW 20 MIN: CPT | Performed by: UROLOGY

## 2023-02-24 ASSESSMENT — PATIENT HEALTH QUESTIONNAIRE - PHQ9
SUM OF ALL RESPONSES TO PHQ9 QUESTIONS 1 & 2: 0
SUM OF ALL RESPONSES TO PHQ QUESTIONS 1-9: 0
1. LITTLE INTEREST OR PLEASURE IN DOING THINGS: 0
2. FEELING DOWN, DEPRESSED OR HOPELESS: 0
SUM OF ALL RESPONSES TO PHQ QUESTIONS 1-9: 0

## 2023-02-24 ASSESSMENT — ENCOUNTER SYMPTOMS
RESPIRATORY NEGATIVE: 1
GASTROINTESTINAL NEGATIVE: 1

## 2023-03-07 ENCOUNTER — HOSPITAL ENCOUNTER (OUTPATIENT)
Dept: MRI IMAGING | Age: 53
Discharge: HOME | End: 2023-03-10

## 2023-03-07 DIAGNOSIS — R97.20 ELEVATED PROSTATE SPECIFIC ANTIGEN (PSA): ICD-10-CM

## 2023-03-07 NOTE — PROGRESS NOTES
[address] [facility]  [address] [address]  [phone]        Mr. Regina Nelson is a 47 y.o. male with a diagnosis of Elevated PSA. INTERVAL HISTORY: Patient is here today for follow-up after his repeat prostate MRI. When I last saw him his PSA had gone up to 6.4. I mistakenly put in my previous note that he had undergone a prostate biopsy before. He has never undergone a prior prostate biopsy. His prostate MRI from 3/7/2023 shows a gland that measures 5.5 x 4.7 x 4.4 cm. His calculated volume is 59.1 cc. His PSA density is 0.11. He was found to have a PI-RADS 3 lesion measuring 1.4 x 0.9cm in left PZ and a second one that measured 0.6 x 0.5 cm in right PZ. I reviewed these images with him today. He has no complaints. He denies any change in his lower urinary tract symptoms. Of note he does have an extensive family history of prostate cancer. He does not take any anticoagulants and has no known drug allergies. From previous note:  Patient is here today for follow-up of his elevated PSA. His PSA was 4.1 in February 2022. It then went down to 3.6 on 8/19/2022. It was rechecked today and was up to 6.4. That is as high as it has been. He has no significant lower urinary tract symptoms. He has had no UTI or prostatitis like symptoms. He does have a family history of prostate cancer in his uncles. Unfortunately his sister passed away 2 to 3 months ago from vaginal cancer. He has not undergone a biopsy of his prostate but did have a prostate MRI on 1/15/2021 which showed a 43 cc gland. There was a PI-RADS 2 lesion but no real worrisome areas seen on his MRI. From previous note:  Patient is here today for follow-up on his elevated PSA. His last PSA was 4.1 on 2/11/2022. He states he has been under quite a bit of stress recently as he unexpectedly lost his sister 3 weeks ago.     He denies any change in his lower urinary tract symptoms he has not had any hematuria or dysuria.  PATTI at his last appointment was normal.  His prostate MRI was 1/15/2021 and showed a 43 cc gland with a PI-RADS 2 area but no discrete lesion.    He does have 3 uncles that have a history of prostate cancer.     From previous note(s):  He returns today for follow-up.  He has no complaints.  His lower urinary tract symptoms have been stable.  He denies any gross hematuria.     His PSA was checked on 2/11/2022 and was 4.1.  His previous 1 was 3.4 and the 1 prior to that was 3.4.  He had 1 in December 2020 that was also 4.1.     He underwent a prostate MRI on 1/15/2021 which showed a 43 cc gland.  He had a PI-RADS 2 lesion but there was no discrete mass.  Overall it was a relatively normal-appearing MRI.  Of note he does have a significant family history for prostate cancer.     He works as a  for a [employer] company here in [place].        elevated PSA.  In December of last year was 4.1.  He underwent an MRI of his prostate on 1/15/2021.  It was relatively unremarkable he had a PI-RADS 2 lesion but on my review nothing terribly concerning on the MRI.  We discussed at that time moving forward with a prostate biopsy versus surveillance and he prefers surveillance.     Of note his digital rectal exam on 1/8/2021 was also unremarkable.     Of note, he has 3 uncles that were diagnosed with prostate cancer.  He has a father and her brother that do not have known prostate cancer.     He has no significant lower urinary tract symptoms and no other complaints today.             Past medical, family and social histories, as well as medications and allergies, were reviewed and updated in the medical record as appropriate.    PMH:     Past Medical History:   Diagnosis Date    Dyslipidemia 5/8/2014    Family history of prostate cancer 5/8/2014    Fracture of right wrist 1979    HTN (hypertension) 5/8/2014    Hyperlipidemia     QUIRINO (obstructive  sleep apnea) 5/8/2014    cpap    Prolonged emergence from general anesthesia        MEDs:     atorvastatin  benazepril  cetirizine  fluticasone  Melatonin Caps     ALLERGIES:    Allergies   Allergen Reactions    Bee Venom Anaphylaxis       ROS:     Review of Systems   Constitutional: Negative. Negative for chills, fatigue and fever. Respiratory: Negative. Cardiovascular: Negative. Gastrointestinal: Negative. Genitourinary: Negative. Negative for difficulty urinating, dysuria, flank pain, frequency, hematuria and urgency. Musculoskeletal: Negative. All other systems reviewed and are negative. PHYSICAL EXAMINATION    There were no vitals taken for this visit. General: well dressed, well nourished, no acute distress  Skin: no rashes  HEENT: Sclera are clear,normocephalic, atraumatic. no external lesions   Cardiovascular: Reg. Normal perfusion  Respiratory: normal respiratory effort, no JVD, no audible wheezing. Musculoskeletal: unremarkable with normal function. No embolic signs or cyanosis. Neurologic exam: intact, no focal deficits, moves all 4 extremities  Psych: normal mood and affect, alert, oriented x 3  LE:  no edema  GI: soft, nontender, no masses, no CVA tenderness  : DEFERRED       LABORATORY RESULTS:    Lab Results   Component Value Date/Time    PSA 6.4 02/24/2023 10:53 AM    PSA 3.6 08/19/2022 09:48 AM    PSA 4.1 02/11/2022 11:49 AM    PSA 3.4 07/30/2021 08:46 AM        IMAGING:      MRI Results:    === 03/07/23 ===    MRI PELVIS W WO CONTRAST    - Narrative -  EXAMINATION: MRI PELVIS W WO CONTRAST 3/7/2023 8:41 AM    ACCESSION NUMBER: [id number]    COMPARISON: None available    INDICATION: Elevated prostate specific antigen (PSA)    TECHNIQUE:  Multiplanar, multisequence MR imaging was performed of the prostate  prior to and following gadolinium contrast.  20 mL Prohance contrast material  was administrated intravenously for the examination.  This study was acquired  following the IV administration of contrast material, given the patient's  indications for the examination. If IV contrast material had not been  administered, the likely of detecting abnormalities relevant to the patient's  condition would have been substantially decreased. FINDINGS:  Last PSA: 6.4  Prostate Size: 5.5 x 4.7 x 4.4 cm with a calculated volume of  59.1 mL. PSA Density: 0.11 ng/mL    BPH: Moderate cystic and nodular BPH. Hemorrhage: None. Peripheral Zone:Heterogeneous signal abnormality throughout both peripheral  zones demonstrating minimal diffusion restriction and early arterial  enhancement. The most suspicious area are described below. .    Transition/Central Zone: No suspicious transition zone lesion. Lesion # 1  -Size and location: A 1.4 x 0.9 cm T2 hypointense area within the left  peripheral zone of the gland base (series 4 image 11)  -DWI/ADC: No significant diffusion restriction. -DCE: Early arterial enhancement.  -Prostate Margin: No evidence of extracapsular extension. -PI-RADS Category: 3    Lesion # 2  -Size and location: 0.6 x 0.5 cm T2 hypointense area within the right peripheral  zone of the mid gland (series 4 image 13)  -DWI/ADC: No significant diffusion restriction. -DCE: Early arterial enhancement.  -Prostate Margin: No evidence of extracapsular extension. -PI-RADS Category: 3    Neurovascular Bundles: Intact. Seminal Vesicles: The seminal vesicles are unremarkable. Lymph Nodes: No appreciable pelvic lymphadenopathy. Other: None    - Impression -  1. Heterogeneous signal abnormality throughout both peripheral zones, left  greater than right, with associated early arterial enhancement. No significant  diffusion restriction. Findings may represent active prostatitis or low-grade  disease (PI-RADS 3). 2.  No pelvic lymphadenopathy. ASSESSMENT:    Mr. Gaston Valdez is a 47 y.o. male with a diagnosis of Elevated PSA. He has not had a prior prostate biopsy.  Given his rising PSA and new MRI findings I have recommended he strongly consider moving forward with a prostate biopsy.  We discussed the risk of that procedure as outlined below.  We discussed alternatives should he not want to move forward with a biopsy.  He does not fact wish to move forward with an MRI fusion biopsy.  We discussed the risk of bleeding, infection, pain, urinary retention, all as outlined below.  I have given him a prescription for ciprofloxacin.  We will schedule this in the next several weeks.    We reviewed with the patient the significance of an elevated PSA.  We discussed benign etiologies such as; benign enlargement of the prostate, inflammation of the prostate, infections of the prostate, and prostatic manipulation as a possible cause. We also discussed the possibility of prostate cancer. The only way to really differentiate this would be a biopsy of the prostate. The most common way to diagnose prostate cancer is an ultrasound-guided biopsy of the prostate with our without MRI fusion.  Alternatively, we could repeat the PSA, do a free/total PSA, or just manage the patient with observation. We discussed the risks inherent in a biopsy including but not limited to; bleeding, infection, urinary retention, and pain associated with the procedure.  We recommend the patient stop any aspirin or nonsteroidal anti-inflammatories or other blood thinners approximately 5 days prior to the procedure. If the patient is taking any of these medications for heart issues or other specific reasons, he needs to consult with the prescribing doctor before stopping treatment.  We explained that we give him prophylactic antibiotic(s) around the time of the procedure to help prevent serious infectious complications (which have been on the rise).  We also discussed concerns with over-treatment of prostate cancer and that the main purpose of the biopsy is to hopefully rule out high-grade prostate cancer. After a  full discussion regarding the potential risks, benefits, and treatment alternatives, the patient has decided to proceed with a prostate biopsy. PLAN:   -review MRI pelv w wo cont   -to OR for TRUS fusion prostate biopsy. R/B's reviewed, printout given  -cipro 500 mg x 2 tablets sent to pharmacy  ________________________________________      I have seen and examined this patient. I have reviewed and edited the note started by the MA and agree with the outlined plan. Part of this note was written by using a voice dictation software. The note has been proof read but may still contain some grammatical/other typographical errors.       Roberto Pak  Urology

## 2023-03-13 ENCOUNTER — OFFICE VISIT (OUTPATIENT)
Dept: ONCOLOGY | Age: 53
End: 2023-03-13
Payer: COMMERCIAL

## 2023-03-13 VITALS
OXYGEN SATURATION: 98 % | HEART RATE: 76 BPM | HEIGHT: 68 IN | SYSTOLIC BLOOD PRESSURE: 145 MMHG | WEIGHT: 255.1 LBS | DIASTOLIC BLOOD PRESSURE: 96 MMHG | RESPIRATION RATE: 14 BRPM | BODY MASS INDEX: 38.66 KG/M2 | TEMPERATURE: 98.4 F

## 2023-03-13 DIAGNOSIS — R97.20 ELEVATED PSA: Primary | ICD-10-CM

## 2023-03-13 PROCEDURE — 3074F SYST BP LT 130 MM HG: CPT | Performed by: UROLOGY

## 2023-03-13 PROCEDURE — 3079F DIAST BP 80-89 MM HG: CPT | Performed by: UROLOGY

## 2023-03-13 PROCEDURE — 99214 OFFICE O/P EST MOD 30 MIN: CPT | Performed by: UROLOGY

## 2023-03-13 RX ORDER — CIPROFLOXACIN 500 MG/1
TABLET, FILM COATED ORAL
Qty: 2 TABLET | Refills: 0 | Status: SHIPPED | OUTPATIENT
Start: 2023-03-13

## 2023-03-13 ASSESSMENT — PATIENT HEALTH QUESTIONNAIRE - PHQ9
2. FEELING DOWN, DEPRESSED OR HOPELESS: 0
1. LITTLE INTEREST OR PLEASURE IN DOING THINGS: 0
SUM OF ALL RESPONSES TO PHQ QUESTIONS 1-9: 0
SUM OF ALL RESPONSES TO PHQ QUESTIONS 1-9: 0
SUM OF ALL RESPONSES TO PHQ9 QUESTIONS 1 & 2: 0
SUM OF ALL RESPONSES TO PHQ QUESTIONS 1-9: 0
SUM OF ALL RESPONSES TO PHQ QUESTIONS 1-9: 0

## 2023-03-13 ASSESSMENT — ENCOUNTER SYMPTOMS
GASTROINTESTINAL NEGATIVE: 1
RESPIRATORY NEGATIVE: 1

## 2023-03-13 NOTE — PATIENT INSTRUCTIONS
Patient Instructions from Today's Visit    Reason for Visit:  Follow up, MRI results    Diagnosis Information:  [email or web address]      Plan: Your MRI shows a PI-RADS 3 lesion. With that and your family history, we do recommend moving forward with a biopsy. Additional information on the biopsy is included in this printout. Blood in your urine and stool for the first few days is normal. Blood in your ejaculate is normal for the first few months as well. A short course of antibiotics will be sent to your pharmacy. Instructions on how to take will be included. Follow Up: With the biopsy    Recent Lab Results:  N/A    Treatment Summary has been discussed and given to patient:   N/A      -------------------------------------------------------------------------------------------------------------------    Patient does express an interest in My Chart. My Chart log in information explained on the after visit summary printout at the [facility] [address] desk. [facility]         PRE-PROSTATE BIOPSY INFORMATION     The procedure involves an ultrasound probe which will be inserted into your rectum to visualize the prostate. A needle with lidocaine will be injected to numb the prostate gland. Once the gland is numb they will insert a biopsy needle to get multiple samples from your prostate gland. A pathologist will check the samples for cancer. The results from the pathologist can take 10-14 days to be finalized. If you haven't heard back in a reasonable time please call. Before your biopsy:    -You will be given antibiotics prior to your biopsy to help decrease the risk of infection. Be sure and call your doctor or come to the Emergency Department if you experience fever or chills after your biopsy.    -Nothing to eat or drink after midnight prior to your biopsy.  You will NOT need to do any bowel prep before the biopsy.     -We will need a urine sample when you check in for your appointment.     -You will need to be off of all major blood thinners, vitamins/herbal medications and anti-inflammatory medications 5 days before your biopsy to prevent any major bleeding. Please see enclosed list or call our line with any medication related questions.     -You will need a  after the procedure. If you need transportation assistance please let the center know at least two days prior to your scheduled procedure. Urology Office #:  [phone]      BIOPSY INSTRUCTIONS / BLOOD THINNERS    IF YOU HAVE ANY HEART ISSUES, HISTORY OF BLOOD CLOTS, STROKE OR OTHER MEDICAL CONDITIONS THAT REQUIRE YOU TO BE ON BLOOD THINNING AGENTS, PLEASE ALERT OUR STAFF. YOU ALSO MAY NEED TO CONTACT YOUR PRESCRIBING PHYSICIAN / CADIOLOGIST FOR THEIR RECOMMENDATION REGARDING THESE MEDICATIONS. ANTI-INFLAMMATORY DRUGS: (SHOULD BE STOPPED 5 DAYS) If you are taking these for a specific medical condition, please alert office for a possible alternative medication         Some examples: Advil, Aleve, Ansaid, Anaprox, Clinoril, Daypro, Feldene, Ibuprofen, Indocin, Midol, Motrin, Nalfon, Naprosyn, Orudis, Ponstil, Tolectin, Toradol    IF YOUR ARE CURRENTLY ON COUMADIN (WARFARIN), PERSANTINE, PLAVIX, HEPARIN, LOVENOX, XARELTO, ELIQUIS OR TICLID:  Please discuss the use of these medications with your doctor as when to discontinue/continue use of these medications. VITAMINS & HERBAL MEDICATIONS: (STOP 5 DAYS PRIOR TO BIOPSY)    ASPIRIN PRODUCTS: Do not need to be discontinued. If you are taking a full dose 325 mg aspirin, change to 81 mg baby aspirin 5 days prior to surgery. Some examples: Vero [place], Ascriptin, Bufferin, [place] Tablets, Ecotrin, Empirin, Equagesic, Excedrin, Florinol, Four way cold tablets, Midol, Norgesic, Nuprin, Pamprin, Synalgos, Triaminicin, Vanquish, Zorprin        Post Biopsy Expectations/Instructions:     It is normal for blood to be in your urine and stool for the first 48 hours after the procedure. Blood can be in the ejaculate for up to 6 weeks. It will be a rust color - this is completely normal.   Watch for urinary retention, as this can occur due to clots. If you experience a temperature of 101.5 or greater accompanied by chills, body aches please go back to ED to be evaluated.

## 2023-03-14 ENCOUNTER — PREP FOR PROCEDURE (OUTPATIENT)
Dept: ONCOLOGY | Age: 53
End: 2023-03-14

## 2023-03-14 PROBLEM — R97.20 ELEVATED PROSTATE SPECIFIC ANTIGEN (PSA): Status: ACTIVE | Noted: 2023-03-14

## 2023-04-06 RX ORDER — MULTIVITAMIN/IRON/FOLIC ACID 18MG-0.4MG
TABLET ORAL
Qty: 30 TABLET | Refills: 5 | Status: SHIPPED | OUTPATIENT
Start: 2023-04-06

## 2023-04-12 RX ORDER — HYDROMORPHONE HYDROCHLORIDE 2 MG/ML
0.5 INJECTION, SOLUTION INTRAMUSCULAR; INTRAVENOUS; SUBCUTANEOUS EVERY 5 MIN PRN
Status: CANCELLED | OUTPATIENT
Start: 2023-04-12

## 2023-04-12 RX ORDER — ONDANSETRON 2 MG/ML
4 INJECTION INTRAMUSCULAR; INTRAVENOUS
Status: CANCELLED | OUTPATIENT
Start: 2023-04-12 | End: 2023-04-13

## 2023-04-12 RX ORDER — HYDROMORPHONE HYDROCHLORIDE 2 MG/ML
0.25 INJECTION, SOLUTION INTRAMUSCULAR; INTRAVENOUS; SUBCUTANEOUS EVERY 5 MIN PRN
Status: CANCELLED | OUTPATIENT
Start: 2023-04-12

## 2023-04-12 RX ORDER — OXYCODONE HYDROCHLORIDE 5 MG/1
5 TABLET ORAL
Status: CANCELLED | OUTPATIENT
Start: 2023-04-12 | End: 2023-04-13

## 2023-04-12 RX ORDER — LABETALOL HYDROCHLORIDE 5 MG/ML
10 INJECTION, SOLUTION INTRAVENOUS
Status: CANCELLED | OUTPATIENT
Start: 2023-04-12

## 2023-04-12 RX ORDER — HYDRALAZINE HYDROCHLORIDE 20 MG/ML
10 INJECTION INTRAMUSCULAR; INTRAVENOUS
Status: CANCELLED | OUTPATIENT
Start: 2023-04-12

## 2023-04-12 RX ORDER — PROCHLORPERAZINE EDISYLATE 5 MG/ML
5 INJECTION INTRAMUSCULAR; INTRAVENOUS
Status: CANCELLED | OUTPATIENT
Start: 2023-04-12 | End: 2023-04-13

## 2023-04-13 ENCOUNTER — HOSPITAL ENCOUNTER (OUTPATIENT)
Age: 53
Setting detail: OUTPATIENT SURGERY
Discharge: HOME | End: 2023-04-13
Attending: UROLOGY | Admitting: UROLOGY
Payer: COMMERCIAL

## 2023-04-13 VITALS
TEMPERATURE: 98.9 F | DIASTOLIC BLOOD PRESSURE: 87 MMHG | HEART RATE: 65 BPM | BODY MASS INDEX: 38.92 KG/M2 | HEIGHT: 68 IN | RESPIRATION RATE: 15 BRPM | WEIGHT: 256.8 LBS | SYSTOLIC BLOOD PRESSURE: 136 MMHG | OXYGEN SATURATION: 100 %

## 2023-04-13 DIAGNOSIS — R97.20 ELEVATED PROSTATE SPECIFIC ANTIGEN (PSA): ICD-10-CM

## 2023-04-13 LAB
APPEARANCE UR: CLEAR
BACTERIA URNS QL MICRO: NEGATIVE /HPF
BILIRUB UR QL: NEGATIVE
CASTS URNS QL MICRO: ABNORMAL /LPF
COLOR UR: ABNORMAL
EPI CELLS #/AREA URNS HPF: ABNORMAL /HPF
GLUCOSE UR STRIP.AUTO-MCNC: NEGATIVE MG/DL
HGB UR QL STRIP: NEGATIVE
KETONES UR QL STRIP.AUTO: ABNORMAL MG/DL
LEUKOCYTE ESTERASE UR QL STRIP.AUTO: NEGATIVE
NITRITE UR QL STRIP.AUTO: NEGATIVE
PH UR STRIP: 6 (ref 5–9)
PROT UR STRIP-MCNC: 30 MG/DL
RBC #/AREA URNS HPF: ABNORMAL /HPF
SP GR UR REFRACTOMETRY: 1.02 (ref 1–1.02)
UROBILINOGEN UR QL STRIP.AUTO: 0.2 EU/DL (ref 0.2–1)
WBC URNS QL MICRO: ABNORMAL /HPF

## 2023-04-13 PROCEDURE — 3600000012 HC SURGERY LEVEL 2 ADDTL 15MIN: Performed by: UROLOGY

## 2023-04-13 PROCEDURE — 6360000002 HC RX W HCPCS: Performed by: UROLOGY

## 2023-04-13 PROCEDURE — 3700000001 HC ADD 15 MINUTES (ANESTHESIA): Performed by: UROLOGY

## 2023-04-13 PROCEDURE — 2709999900 HC NON-CHARGEABLE SUPPLY: Performed by: UROLOGY

## 2023-04-13 PROCEDURE — 76872 US TRANSRECTAL: CPT | Performed by: UROLOGY

## 2023-04-13 PROCEDURE — 81001 URINALYSIS AUTO W/SCOPE: CPT

## 2023-04-13 PROCEDURE — 55700 PR PROSTATE NEEDLE BIOPSY ANY APPROACH: CPT | Performed by: UROLOGY

## 2023-04-13 PROCEDURE — 2580000003 HC RX 258: Performed by: ANESTHESIOLOGY

## 2023-04-13 PROCEDURE — 3600000002 HC SURGERY LEVEL 2 BASE: Performed by: UROLOGY

## 2023-04-13 PROCEDURE — 7100000000 HC PACU RECOVERY - FIRST 15 MIN: Performed by: UROLOGY

## 2023-04-13 PROCEDURE — 2580000003 HC RX 258: Performed by: UROLOGY

## 2023-04-13 PROCEDURE — 6370000000 HC RX 637 (ALT 250 FOR IP): Performed by: ANESTHESIOLOGY

## 2023-04-13 PROCEDURE — 88305 TISSUE EXAM BY PATHOLOGIST: CPT

## 2023-04-13 PROCEDURE — 7100000001 HC PACU RECOVERY - ADDTL 15 MIN: Performed by: UROLOGY

## 2023-04-13 PROCEDURE — 7100000010 HC PHASE II RECOVERY - FIRST 15 MIN: Performed by: UROLOGY

## 2023-04-13 PROCEDURE — 3700000000 HC ANESTHESIA ATTENDED CARE: Performed by: UROLOGY

## 2023-04-13 RX ORDER — FENTANYL CITRATE 50 UG/ML
100 INJECTION, SOLUTION INTRAMUSCULAR; INTRAVENOUS
Status: DISCONTINUED | OUTPATIENT
Start: 2023-04-13 | End: 2023-04-13 | Stop reason: HOSPADM

## 2023-04-13 RX ORDER — SODIUM CHLORIDE 0.9 % (FLUSH) 0.9 %
5-40 SYRINGE (ML) INJECTION PRN
Status: DISCONTINUED | OUTPATIENT
Start: 2023-04-13 | End: 2023-04-13 | Stop reason: HOSPADM

## 2023-04-13 RX ORDER — SODIUM CHLORIDE, SODIUM LACTATE, POTASSIUM CHLORIDE, CALCIUM CHLORIDE 600; 310; 30; 20 MG/100ML; MG/100ML; MG/100ML; MG/100ML
INJECTION, SOLUTION INTRAVENOUS CONTINUOUS
Status: DISCONTINUED | OUTPATIENT
Start: 2023-04-13 | End: 2023-04-13 | Stop reason: HOSPADM

## 2023-04-13 RX ORDER — LIDOCAINE HYDROCHLORIDE 10 MG/ML
1 INJECTION, SOLUTION INFILTRATION; PERINEURAL
Status: DISCONTINUED | OUTPATIENT
Start: 2023-04-13 | End: 2023-04-13 | Stop reason: HOSPADM

## 2023-04-13 RX ORDER — SODIUM CHLORIDE 0.9 % (FLUSH) 0.9 %
5-40 SYRINGE (ML) INJECTION EVERY 12 HOURS SCHEDULED
Status: DISCONTINUED | OUTPATIENT
Start: 2023-04-13 | End: 2023-04-13 | Stop reason: HOSPADM

## 2023-04-13 RX ORDER — SODIUM CHLORIDE 9 MG/ML
INJECTION, SOLUTION INTRAVENOUS PRN
Status: DISCONTINUED | OUTPATIENT
Start: 2023-04-13 | End: 2023-04-13 | Stop reason: HOSPADM

## 2023-04-13 RX ORDER — ACETAMINOPHEN 500 MG
1000 TABLET ORAL ONCE
Status: COMPLETED | OUTPATIENT
Start: 2023-04-13 | End: 2023-04-13

## 2023-04-13 RX ORDER — MIDAZOLAM HYDROCHLORIDE 2 MG/2ML
2 INJECTION, SOLUTION INTRAMUSCULAR; INTRAVENOUS
Status: DISCONTINUED | OUTPATIENT
Start: 2023-04-13 | End: 2023-04-13 | Stop reason: HOSPADM

## 2023-04-13 RX ADMIN — SODIUM CHLORIDE, POTASSIUM CHLORIDE, SODIUM LACTATE AND CALCIUM CHLORIDE: 600; 310; 30; 20 INJECTION, SOLUTION INTRAVENOUS at 12:46

## 2023-04-13 RX ADMIN — ACETAMINOPHEN 1000 MG: 500 TABLET, FILM COATED ORAL at 12:46

## 2023-04-13 RX ADMIN — CEFTRIAXONE SODIUM 2000 MG: 2 INJECTION, POWDER, FOR SOLUTION INTRAMUSCULAR; INTRAVENOUS at 12:46

## 2023-04-13 ASSESSMENT — PAIN - FUNCTIONAL ASSESSMENT
PAIN_FUNCTIONAL_ASSESSMENT: NONE - DENIES PAIN
PAIN_FUNCTIONAL_ASSESSMENT: 0-10

## 2023-04-13 NOTE — DISCHARGE INSTRUCTIONS
My office will schedule your follow-up appointment. Please call our office ([phone]) or return to ED if you experience fever > 101.5, severe pain, persistent nausea/vomiting, excessive bleeding, inability to urinate, or other concerns. Please take your cipro tablet tonight. If you have had surgery in the past 7-10 days by one of our providers and are having fever, bleeding, or drainage from an incision, have an opening in an incision, or having issues urinating properly, please call [phone]. Prostate Biopsy and Ultrasound:   A prostate biopsy is a type of test. Your doctor takes small tissue samples from your prostate gland. Then another doctor looks at the tissue under a microscope to see if there are cancer cells. This test is done by a doctor who specializes in men's genital and urinary problems (urologist). It can be done in your doctor's office, a day surgery clinic, or a hospital operating room. To get the tissue samples from the prostate, the doctor inserts a thin needle through the rectum, the urethra, or the area between the anus and scrotum (perineum). The most common method is through the rectum. Your doctor may use ultrasound to help guide the needle. What else should you know about this test?  A prostate biopsy has a slight risk of causing problems such as infection or bleeding. If the biopsy went through your rectum, you may have a small amount of bleeding from your rectum for 2 to 3 days after the biopsy. You may have a little pain in your pelvic area. You may also have a little blood in your urine for 1 to 5 days. You may have some blood in your semen for a week or longer. Do not do heavy work or exercise for 4 hours after the test.  Your doctor will tell you how long it may take to get your results back. Follow-up care is a key part of your treatment and safety. Be sure to make and go to all appointments, and call your doctor if you are having problems.  It's also a

## 2023-04-13 NOTE — OP NOTE
Operative Note        Patient: Imelda Coffey, [id number]    Date of Surgery: 04/13/23    Preoperative Diagnosis: Elevated PSA    Postoperative Diagnosis:  same    Surgeon(s) and Role:     * Saroj Sorto MD - Primary     Anesthesia:  MAC     Procedure: Procedure(s):  Lazarus Pong MRI fused TRUS prostate biopsy     Indications:     Discussed the risk of surgery including infection, hematoma, bleeding, urinary retention, and the risks of anesthethesia. The patient understands the risks, any and all questions were answered to the patient's satisfaction and signed the consent for operation. URONAV MRI FUSED TRANSRECTAL ULTRASOUND GUIDED BIOPSY OF THE PROSTATE    All risks, benefits and alternatives were again reviewed and he is willing to proceed at this time. The patient was placed in the left lateral decubitus position. Rocephin was given as a prophylactic antibiotic. A betadine swab was used to prep the anus/rectum. I then inserted the transrectal ultrasound probe into the rectum. The prostate was visualized. The prostate appeared homogenous in appearance. Ultrasonographic sweep of the prostate was performed to obtain images to link to MRI via URONAV. There were 2 regions of interest (roi1 left PZ; JESSY 2 right PZ) based upon MRI imaging. 3 biopsies of regions of interest were then obtained using the ultrasound images for guidance that had been linked to the previous MRI using Uronav. I then performed 12 needle core biopsies using a standard sextant biopsy format with traditional ultrasound images for guidance. The ultrasound probe was removed. The patient tolerated the procedure well. PROSTATE VOLUME:  53 cc    PATTI: No induration or nodule     Estimated Blood Loss:  minimal    Specimens: prostate biopsies             Drains: none                 Implants: * No implants in log *    Shannan Guerrero M.D.     [facility]  [address] [place] [address]

## 2023-05-01 ENCOUNTER — OFFICE VISIT (OUTPATIENT)
Dept: ONCOLOGY | Age: 53
End: 2023-05-01
Payer: COMMERCIAL

## 2023-05-01 VITALS
HEART RATE: 74 BPM | OXYGEN SATURATION: 98 % | RESPIRATION RATE: 14 BRPM | TEMPERATURE: 98.8 F | BODY MASS INDEX: 39.4 KG/M2 | SYSTOLIC BLOOD PRESSURE: 134 MMHG | HEIGHT: 68 IN | DIASTOLIC BLOOD PRESSURE: 93 MMHG | WEIGHT: 260 LBS

## 2023-05-01 DIAGNOSIS — R97.20 ELEVATED PSA: Primary | ICD-10-CM

## 2023-05-01 PROCEDURE — 3075F SYST BP GE 130 - 139MM HG: CPT | Performed by: UROLOGY

## 2023-05-01 PROCEDURE — 3080F DIAST BP >= 90 MM HG: CPT | Performed by: UROLOGY

## 2023-05-01 PROCEDURE — 99213 OFFICE O/P EST LOW 20 MIN: CPT | Performed by: UROLOGY

## 2023-05-01 ASSESSMENT — PATIENT HEALTH QUESTIONNAIRE - PHQ9
1. LITTLE INTEREST OR PLEASURE IN DOING THINGS: 0
SUM OF ALL RESPONSES TO PHQ QUESTIONS 1-9: 0
SUM OF ALL RESPONSES TO PHQ9 QUESTIONS 1 & 2: 0
SUM OF ALL RESPONSES TO PHQ QUESTIONS 1-9: 0
2. FEELING DOWN, DEPRESSED OR HOPELESS: 0

## 2023-05-01 ASSESSMENT — ENCOUNTER SYMPTOMS
RESPIRATORY NEGATIVE: 1
GASTROINTESTINAL NEGATIVE: 1

## 2023-05-15 NOTE — PROGRESS NOTES
X  )-Uiikxf Tubing (1 per 3 mon)  (   X  )- Disposable Filter (2 per mon)  (   x  )-Mqbzfj Humidifier (1 per year)     ( x    )-Hinhgcrds (sometimes used with Full Face Mask) (1 per 6 mos)  (    )-Tubing-without heat (1 per 3 mos)  (     )-Non-Disposable Filter (1 per 6 mos)  (  x   )-Water Chamber (1 per 6 mos)  (     )-Humidifier non-heated (1 per 5 yrs)      Signed Date: 5/17/2023  Electronically Signed By: TINA Bergeron CNP  Electronically Dated:  5/17/2023               Collaborating Physician: Dr. Tae Maria    Over 50% of today's office visit was spent in face to face time reviewing test results, prognosis, importance of compliance, education about disease process, benefits of medications, instructions for management of acute flare-ups, and follow up plans. Total face to face time spent with patient was 20 minutes.         TINA Bergeron CNP  Electronically signed

## 2023-05-17 ENCOUNTER — OFFICE VISIT (OUTPATIENT)
Dept: SLEEP MEDICINE | Age: 53
End: 2023-05-17
Payer: COMMERCIAL

## 2023-05-17 ENCOUNTER — TELEPHONE (OUTPATIENT)
Dept: SLEEP MEDICINE | Age: 53
End: 2023-05-17

## 2023-05-17 VITALS
SYSTOLIC BLOOD PRESSURE: 129 MMHG | BODY MASS INDEX: 39.56 KG/M2 | HEIGHT: 68 IN | WEIGHT: 261 LBS | DIASTOLIC BLOOD PRESSURE: 81 MMHG | TEMPERATURE: 98.2 F | HEART RATE: 83 BPM | OXYGEN SATURATION: 95 %

## 2023-05-17 DIAGNOSIS — G47.33 OSA (OBSTRUCTIVE SLEEP APNEA): Primary | ICD-10-CM

## 2023-05-17 DIAGNOSIS — E66.9 OBESITY (BMI 35.0-39.9 WITHOUT COMORBIDITY): ICD-10-CM

## 2023-05-17 PROCEDURE — 3074F SYST BP LT 130 MM HG: CPT | Performed by: NURSE PRACTITIONER

## 2023-05-17 PROCEDURE — 3079F DIAST BP 80-89 MM HG: CPT | Performed by: NURSE PRACTITIONER

## 2023-05-17 PROCEDURE — 99213 OFFICE O/P EST LOW 20 MIN: CPT | Performed by: NURSE PRACTITIONER

## 2023-05-17 ASSESSMENT — SLEEP AND FATIGUE QUESTIONNAIRES
HOW LIKELY ARE YOU TO NOD OFF OR FALL ASLEEP WHILE SITTING QUIETLY AFTER LUNCH WITHOUT ALCOHOL: 0
HOW LIKELY ARE YOU TO NOD OFF OR FALL ASLEEP WHILE SITTING INACTIVE IN A PUBLIC PLACE: 0
HOW LIKELY ARE YOU TO NOD OFF OR FALL ASLEEP WHILE SITTING AND TALKING TO SOMEONE: 0
ESS TOTAL SCORE: 4
HOW LIKELY ARE YOU TO NOD OFF OR FALL ASLEEP WHILE LYING DOWN TO REST IN THE AFTERNOON WHEN CIRCUMSTANCES PERMIT: 3
HOW LIKELY ARE YOU TO NOD OFF OR FALL ASLEEP WHILE WATCHING TV: 0
HOW LIKELY ARE YOU TO NOD OFF OR FALL ASLEEP WHEN YOU ARE A PASSENGER IN A CAR FOR AN HOUR WITHOUT A BREAK: 1
HOW LIKELY ARE YOU TO NOD OFF OR FALL ASLEEP IN A CAR, WHILE STOPPED FOR A FEW MINUTES IN TRAFFIC: 0
HOW LIKELY ARE YOU TO NOD OFF OR FALL ASLEEP WHILE SITTING AND READING: 0

## 2023-05-31 SDOH — ECONOMIC STABILITY: HOUSING INSECURITY
IN THE LAST 12 MONTHS, WAS THERE A TIME WHEN YOU DID NOT HAVE A STEADY PLACE TO SLEEP OR SLEPT IN A SHELTER (INCLUDING NOW)?: PATIENT REFUSED

## 2023-05-31 SDOH — ECONOMIC STABILITY: FOOD INSECURITY: WITHIN THE PAST 12 MONTHS, THE FOOD YOU BOUGHT JUST DIDN'T LAST AND YOU DIDN'T HAVE MONEY TO GET MORE.: PATIENT DECLINED

## 2023-05-31 SDOH — ECONOMIC STABILITY: TRANSPORTATION INSECURITY
IN THE PAST 12 MONTHS, HAS LACK OF TRANSPORTATION KEPT YOU FROM MEETINGS, WORK, OR FROM GETTING THINGS NEEDED FOR DAILY LIVING?: PATIENT DECLINED

## 2023-05-31 SDOH — ECONOMIC STABILITY: INCOME INSECURITY: HOW HARD IS IT FOR YOU TO PAY FOR THE VERY BASICS LIKE FOOD, HOUSING, MEDICAL CARE, AND HEATING?: PATIENT DECLINED

## 2023-05-31 SDOH — ECONOMIC STABILITY: FOOD INSECURITY: WITHIN THE PAST 12 MONTHS, YOU WORRIED THAT YOUR FOOD WOULD RUN OUT BEFORE YOU GOT MONEY TO BUY MORE.: PATIENT DECLINED

## 2023-06-01 ENCOUNTER — OFFICE VISIT (OUTPATIENT)
Dept: FAMILY MEDICINE CLINIC | Facility: CLINIC | Age: 53
End: 2023-06-01
Payer: COMMERCIAL

## 2023-06-01 VITALS
BODY MASS INDEX: 39.1 KG/M2 | RESPIRATION RATE: 16 BRPM | TEMPERATURE: 97.8 F | DIASTOLIC BLOOD PRESSURE: 88 MMHG | WEIGHT: 258 LBS | OXYGEN SATURATION: 97 % | SYSTOLIC BLOOD PRESSURE: 130 MMHG | HEART RATE: 82 BPM | HEIGHT: 68 IN

## 2023-06-01 DIAGNOSIS — I10 HYPERTENSION, ESSENTIAL, BENIGN: ICD-10-CM

## 2023-06-01 DIAGNOSIS — E66.9 OBESITY (BMI 30-39.9): ICD-10-CM

## 2023-06-01 DIAGNOSIS — J30.1 NON-SEASONAL ALLERGIC RHINITIS DUE TO POLLEN: ICD-10-CM

## 2023-06-01 DIAGNOSIS — E78.2 MIXED HYPERLIPIDEMIA: Primary | ICD-10-CM

## 2023-06-01 DIAGNOSIS — E78.2 MIXED HYPERLIPIDEMIA: ICD-10-CM

## 2023-06-01 LAB
BASOPHILS # BLD: 0.1 K/UL (ref 0–0.2)
BASOPHILS NFR BLD: 2 % (ref 0–2)
DIFFERENTIAL METHOD BLD: ABNORMAL
EOSINOPHIL # BLD: 0.1 K/UL (ref 0–0.8)
EOSINOPHIL NFR BLD: 2 % (ref 0.5–7.8)
ERYTHROCYTE [DISTWIDTH] IN BLOOD BY AUTOMATED COUNT: 12.9 % (ref 11.9–14.6)
HCT VFR BLD AUTO: 51.2 % (ref 41.1–50.3)
HGB BLD-MCNC: 17 G/DL (ref 13.6–17.2)
IMM GRANULOCYTES # BLD AUTO: 0 K/UL (ref 0–0.5)
IMM GRANULOCYTES NFR BLD AUTO: 0 % (ref 0–5)
LYMPHOCYTES # BLD: 1.6 K/UL (ref 0.5–4.6)
LYMPHOCYTES NFR BLD: 34 % (ref 13–44)
MCH RBC QN AUTO: 28.2 PG (ref 26.1–32.9)
MCHC RBC AUTO-ENTMCNC: 33.2 G/DL (ref 31.4–35)
MCV RBC AUTO: 84.9 FL (ref 82–102)
MONOCYTES # BLD: 0.2 K/UL (ref 0.1–1.3)
MONOCYTES NFR BLD: 5 % (ref 4–12)
NEUTS SEG # BLD: 2.8 K/UL (ref 1.7–8.2)
NEUTS SEG NFR BLD: 57 % (ref 43–78)
NRBC # BLD: 0 K/UL (ref 0–0.2)
PLATELET # BLD AUTO: 208 K/UL (ref 150–450)
PMV BLD AUTO: 12 FL (ref 9.4–12.3)
RBC # BLD AUTO: 6.03 M/UL (ref 4.23–5.6)
WBC # BLD AUTO: 4.8 K/UL (ref 4.3–11.1)

## 2023-06-01 PROCEDURE — 99214 OFFICE O/P EST MOD 30 MIN: CPT | Performed by: FAMILY MEDICINE

## 2023-06-01 PROCEDURE — 3075F SYST BP GE 130 - 139MM HG: CPT | Performed by: FAMILY MEDICINE

## 2023-06-01 PROCEDURE — 3079F DIAST BP 80-89 MM HG: CPT | Performed by: FAMILY MEDICINE

## 2023-06-01 RX ORDER — CETIRIZINE HYDROCHLORIDE 10 MG/1
10 TABLET ORAL DAILY PRN
Qty: 90 TABLET | Refills: 1 | Status: SHIPPED | OUTPATIENT
Start: 2023-06-01

## 2023-06-01 RX ORDER — ATORVASTATIN CALCIUM 10 MG/1
10 TABLET, FILM COATED ORAL NIGHTLY
Qty: 90 TABLET | Refills: 1 | Status: SHIPPED | OUTPATIENT
Start: 2023-06-01

## 2023-06-01 RX ORDER — BENAZEPRIL HYDROCHLORIDE 20 MG/1
20 TABLET ORAL DAILY
Qty: 90 TABLET | Refills: 1 | Status: SHIPPED | OUTPATIENT
Start: 2023-06-01

## 2023-06-01 RX ORDER — FLUTICASONE PROPIONATE 50 MCG
2 SPRAY, SUSPENSION (ML) NASAL DAILY PRN
Qty: 16 EACH | Refills: 2 | Status: SHIPPED | OUTPATIENT
Start: 2023-06-01

## 2023-06-01 ASSESSMENT — PATIENT HEALTH QUESTIONNAIRE - PHQ9
SUM OF ALL RESPONSES TO PHQ QUESTIONS 1-9: 0
2. FEELING DOWN, DEPRESSED OR HOPELESS: 0
SUM OF ALL RESPONSES TO PHQ QUESTIONS 1-9: 0
SUM OF ALL RESPONSES TO PHQ9 QUESTIONS 1 & 2: 0
1. LITTLE INTEREST OR PLEASURE IN DOING THINGS: 0
SUM OF ALL RESPONSES TO PHQ QUESTIONS 1-9: 0
SUM OF ALL RESPONSES TO PHQ QUESTIONS 1-9: 0

## 2023-06-01 ASSESSMENT — ENCOUNTER SYMPTOMS
VOMITING: 0
ABDOMINAL PAIN: 0
DIARRHEA: 0
SHORTNESS OF BREATH: 0
WHEEZING: 0
ABDOMINAL DISTENTION: 0
NAUSEA: 0
EYE DISCHARGE: 0
EYE ITCHING: 0
SORE THROAT: 0
COUGH: 0
EYE REDNESS: 0
SINUS PAIN: 0

## 2023-06-01 NOTE — PROGRESS NOTES
Shanthi Smith (:  1970) is a 46 y.o. male,Established patient, here for evaluation of the following chief complaint(s):  Medication Refill and Labs Only         ASSESSMENT/PLAN:  1. Mixed hyperlipidemia  -     atorvastatin (LIPITOR) 10 MG tablet; Take 1 tablet by mouth at bedtime, Disp-90 tablet, R-1Normal  -     Comprehensive Metabolic Panel; Future  -     Lipid Panel; Future  2. Hypertension, essential, benign  -     benazepril (LOTENSIN) 20 MG tablet; Take 1 tablet by mouth daily TAKE 1 TAB BY MOUTH DAILY. , Disp-90 tablet, R-1Normal  -     CBC with Auto Differential; Future  -     Comprehensive Metabolic Panel; Future  3. Non-seasonal allergic rhinitis due to pollen  -     cetirizine (ZYRTEC) 10 MG tablet; Take 1 tablet by mouth daily as needed for Allergies (prn), Disp-90 tablet, R-1Normal  -     fluticasone (FLONASE) 50 MCG/ACT nasal spray; 2 sprays by Nasal route daily as needed for Allergies (prn), Disp-16 each, R-2Normal  4. Obesity (BMI 30-39.9)  5. BMI 39.0-39.9,adult    Await labs, to continue to avoid salt, strongly advised to work on a low fat diet. Continue to use Saline NS at HS, may use Zyrtec and Flonase NS prn, continue to wear a mask while cutting grass. Strongly advised patient to lose weight. Also advised to exercise regularly, to eat healthy foods, and to control portion sizes. Return in about 6 months (around 2023) for medication refills, fasting labs or prn sooner. Subjective   SUBJECTIVE/OBJECTIVE:  Medication Refill  Pertinent negatives include no abdominal pain, chest pain, chills, congestion, coughing, fever, headaches, nausea, sore throat or vomiting. Patient comes today for follow-up, is fasting for labs, denies any side effects from his medicines, denies any associated symptoms. Says he was diagnosed with severe Sleep Apnea, was not using his old machine, got a new machine, had an appointment last month.  He is now taking Melatonin 5 mg as needed-

## 2023-06-02 LAB
ALBUMIN SERPL-MCNC: 4.2 G/DL (ref 3.5–5)
ALBUMIN/GLOB SERPL: 1.1 (ref 0.4–1.6)
ALP SERPL-CCNC: 60 U/L (ref 50–136)
ALT SERPL-CCNC: 50 U/L (ref 12–65)
ANION GAP SERPL CALC-SCNC: 8 MMOL/L (ref 2–11)
AST SERPL-CCNC: 35 U/L (ref 15–37)
BILIRUB SERPL-MCNC: 0.7 MG/DL (ref 0.2–1.1)
BUN SERPL-MCNC: 7 MG/DL (ref 6–23)
CALCIUM SERPL-MCNC: 9.6 MG/DL (ref 8.3–10.4)
CHLORIDE SERPL-SCNC: 105 MMOL/L (ref 101–110)
CHOLEST SERPL-MCNC: 120 MG/DL
CO2 SERPL-SCNC: 25 MMOL/L (ref 21–32)
CREAT SERPL-MCNC: 1.4 MG/DL (ref 0.8–1.5)
GLOBULIN SER CALC-MCNC: 3.8 G/DL (ref 2.8–4.5)
GLUCOSE SERPL-MCNC: 99 MG/DL (ref 65–100)
HDLC SERPL-MCNC: 52 MG/DL (ref 40–60)
HDLC SERPL: 2.3
LDLC SERPL CALC-MCNC: 60.2 MG/DL
POTASSIUM SERPL-SCNC: 3.1 MMOL/L (ref 3.5–5.1)
PROT SERPL-MCNC: 8 G/DL (ref 6.3–8.2)
SODIUM SERPL-SCNC: 138 MMOL/L (ref 133–143)
TRIGL SERPL-MCNC: 39 MG/DL (ref 35–150)
VLDLC SERPL CALC-MCNC: 7.8 MG/DL (ref 6–23)

## 2023-07-15 ENCOUNTER — HOSPITAL ENCOUNTER (EMERGENCY)
Age: 53
Discharge: HOME | End: 2023-07-15
Attending: EMERGENCY MEDICINE
Payer: COMMERCIAL

## 2023-07-15 VITALS
SYSTOLIC BLOOD PRESSURE: 182 MMHG | BODY MASS INDEX: 39.1 KG/M2 | OXYGEN SATURATION: 100 % | RESPIRATION RATE: 20 BRPM | HEART RATE: 84 BPM | WEIGHT: 258 LBS | DIASTOLIC BLOOD PRESSURE: 97 MMHG | TEMPERATURE: 98.7 F | HEIGHT: 68 IN

## 2023-07-15 DIAGNOSIS — K64.9 BLEEDING HEMORRHOID: Primary | ICD-10-CM

## 2023-07-15 PROCEDURE — 99283 EMERGENCY DEPT VISIT LOW MDM: CPT

## 2023-07-15 RX ORDER — DOCUSATE SODIUM 100 MG/1
100 CAPSULE, LIQUID FILLED ORAL 2 TIMES DAILY
Qty: 30 CAPSULE | Refills: 0 | Status: SHIPPED | OUTPATIENT
Start: 2023-07-15

## 2023-07-15 RX ORDER — HYDROCORTISONE 25 MG/G
CREAM TOPICAL 2 TIMES DAILY
Qty: 28 G | Refills: 0 | Status: SHIPPED | OUTPATIENT
Start: 2023-07-15

## 2023-07-15 ASSESSMENT — LIFESTYLE VARIABLES
HOW OFTEN DO YOU HAVE A DRINK CONTAINING ALCOHOL: MONTHLY OR LESS
HOW MANY STANDARD DRINKS CONTAINING ALCOHOL DO YOU HAVE ON A TYPICAL DAY: 1 OR 2

## 2023-07-15 ASSESSMENT — PAIN SCALES - GENERAL: PAINLEVEL_OUTOF10: 8

## 2023-07-15 ASSESSMENT — PAIN - FUNCTIONAL ASSESSMENT: PAIN_FUNCTIONAL_ASSESSMENT: 0-10

## 2023-07-15 ASSESSMENT — PAIN DESCRIPTION - LOCATION: LOCATION: RECTUM

## 2023-07-15 ASSESSMENT — PAIN DESCRIPTION - DESCRIPTORS: DESCRIPTORS: ACHING

## 2023-07-15 NOTE — ED TRIAGE NOTES
Per patient rectal pain x6 days\"I can feel a hemorrhoid. I've been using witch hazel, and preparation h. Since thursday I've had bright red bleeding when I wipe. \" Denies n/v/d. Denies fever/chills.

## 2023-07-15 NOTE — DISCHARGE INSTRUCTIONS
Apply pressure to bleeding area. Use nitroglycerin and Anusol cream twice daily at different times. Use daily stool softener such as Colace. Call general surgeon on Monday for close follow-up appointment. Return for worsening or concerning symptoms.

## 2023-07-28 ENCOUNTER — OFFICE VISIT (OUTPATIENT)
Dept: SURGERY | Age: 53
End: 2023-07-28
Payer: COMMERCIAL

## 2023-07-28 VITALS — WEIGHT: 259 LBS | OXYGEN SATURATION: 97 % | HEART RATE: 71 BPM | HEIGHT: 68 IN | BODY MASS INDEX: 39.25 KG/M2

## 2023-07-28 DIAGNOSIS — K64.5 THROMBOSED EXTERNAL HEMORRHOID: Primary | ICD-10-CM

## 2023-07-28 PROCEDURE — 99213 OFFICE O/P EST LOW 20 MIN: CPT | Performed by: SURGERY

## 2023-09-04 ASSESSMENT — PATIENT HEALTH QUESTIONNAIRE - PHQ9
2. FEELING DOWN, DEPRESSED OR HOPELESS: 0
1. LITTLE INTEREST OR PLEASURE IN DOING THINGS: 0
SUM OF ALL RESPONSES TO PHQ QUESTIONS 1-9: 0
SUM OF ALL RESPONSES TO PHQ9 QUESTIONS 1 & 2: 0
SUM OF ALL RESPONSES TO PHQ QUESTIONS 1-9: 0
1. LITTLE INTEREST OR PLEASURE IN DOING THINGS: NOT AT ALL
SUM OF ALL RESPONSES TO PHQ9 QUESTIONS 1 & 2: 0
SUM OF ALL RESPONSES TO PHQ QUESTIONS 1-9: 0
2. FEELING DOWN, DEPRESSED OR HOPELESS: NOT AT ALL
SUM OF ALL RESPONSES TO PHQ QUESTIONS 1-9: 0

## 2023-09-06 ENCOUNTER — OFFICE VISIT (OUTPATIENT)
Dept: FAMILY MEDICINE CLINIC | Facility: CLINIC | Age: 53
End: 2023-09-06
Payer: COMMERCIAL

## 2023-09-06 VITALS
HEART RATE: 72 BPM | BODY MASS INDEX: 40.16 KG/M2 | DIASTOLIC BLOOD PRESSURE: 80 MMHG | WEIGHT: 265 LBS | OXYGEN SATURATION: 97 % | HEIGHT: 68 IN | TEMPERATURE: 97.3 F | RESPIRATION RATE: 16 BRPM | SYSTOLIC BLOOD PRESSURE: 126 MMHG

## 2023-09-06 DIAGNOSIS — Z00.00 PHYSICAL EXAM, ANNUAL: Primary | ICD-10-CM

## 2023-09-06 DIAGNOSIS — E87.6 HYPOKALEMIA: Primary | ICD-10-CM

## 2023-09-06 DIAGNOSIS — Z00.00 PHYSICAL EXAM, ANNUAL: ICD-10-CM

## 2023-09-06 LAB
ALBUMIN SERPL-MCNC: 4 G/DL (ref 3.5–5)
ALBUMIN/GLOB SERPL: 1 (ref 0.4–1.6)
ALP SERPL-CCNC: 62 U/L (ref 50–136)
ALT SERPL-CCNC: 50 U/L (ref 12–65)
ANION GAP SERPL CALC-SCNC: 7 MMOL/L (ref 2–11)
AST SERPL-CCNC: 35 U/L (ref 15–37)
BILIRUB SERPL-MCNC: 0.7 MG/DL (ref 0.2–1.1)
BUN SERPL-MCNC: 8 MG/DL (ref 6–23)
CALCIUM SERPL-MCNC: 8.9 MG/DL (ref 8.3–10.4)
CHLORIDE SERPL-SCNC: 108 MMOL/L (ref 101–110)
CO2 SERPL-SCNC: 25 MMOL/L (ref 21–32)
CREAT SERPL-MCNC: 1.4 MG/DL (ref 0.8–1.5)
GLOBULIN SER CALC-MCNC: 3.9 G/DL (ref 2.8–4.5)
GLUCOSE SERPL-MCNC: 118 MG/DL (ref 65–100)
POTASSIUM SERPL-SCNC: 3.3 MMOL/L (ref 3.5–5.1)
PROT SERPL-MCNC: 7.9 G/DL (ref 6.3–8.2)
SODIUM SERPL-SCNC: 140 MMOL/L (ref 133–143)

## 2023-09-06 PROCEDURE — 99396 PREV VISIT EST AGE 40-64: CPT | Performed by: FAMILY MEDICINE

## 2023-09-06 PROCEDURE — 3080F DIAST BP >= 90 MM HG: CPT | Performed by: FAMILY MEDICINE

## 2023-09-06 PROCEDURE — 3074F SYST BP LT 130 MM HG: CPT | Performed by: FAMILY MEDICINE

## 2023-09-06 ASSESSMENT — ENCOUNTER SYMPTOMS
BACK PAIN: 0
SINUS PAIN: 0
WHEEZING: 0
ABDOMINAL PAIN: 0
NAUSEA: 0
EYE PAIN: 0
SORE THROAT: 0
COUGH: 0
VOICE CHANGE: 0
BLOOD IN STOOL: 0
EYE REDNESS: 0
SHORTNESS OF BREATH: 0
DIARRHEA: 0
PHOTOPHOBIA: 0
VOMITING: 0

## 2023-09-06 NOTE — PROGRESS NOTES
General: He is not in acute distress. Appearance: He is obese. HENT:      Head: Normocephalic and atraumatic. Right Ear: Tympanic membrane and ear canal normal.      Left Ear: Tympanic membrane and ear canal normal.      Nose: Nose normal. No congestion. Mouth/Throat:      Mouth: Mucous membranes are moist.      Pharynx: Oropharynx is clear. Eyes:      Extraocular Movements: Extraocular movements intact. Conjunctiva/sclera: Conjunctivae normal.      Pupils: Pupils are equal, round, and reactive to light. Neck:      Vascular: No carotid bruit. Comments: No thyromegaly  Cardiovascular:      Rate and Rhythm: Normal rate and regular rhythm. Pulmonary:      Effort: Pulmonary effort is normal. No respiratory distress. Breath sounds: Normal breath sounds. Abdominal:      General: Bowel sounds are normal. There is no distension. Palpations: Abdomen is soft. Tenderness: There is no abdominal tenderness. There is no right CVA tenderness or left CVA tenderness. Genitourinary:     Comments: Ann SOLORZANO, was present in the room as chaperone. No external lesions or discharge seen, no testicular masses, no palpable inguinal hernias  Musculoskeletal:      Cervical back: Neck supple. No tenderness. Right lower leg: No edema. Left lower leg: No edema. Lymphadenopathy:      Cervical: No cervical adenopathy. Skin:     General: Skin is warm and dry. Neurological:      Mental Status: He is alert and oriented to person, place, and time. Mental status is at baseline. Motor: No weakness. Gait: Gait normal.      Deep Tendon Reflexes: Reflexes normal.   Psychiatric:         Mood and Affect: Mood normal.         Behavior: Behavior normal.       No flowsheet data found.     Lab Results   Component Value Date/Time    CHOL 120 06/01/2023 08:32 AM    CHOL 135 12/09/2022 08:38 AM    CHOL 116 06/15/2022 11:38 AM    TRIG 39 06/01/2023 08:32 AM    TRIG 56 12/09/2022

## 2023-09-07 RX ORDER — POTASSIUM CHLORIDE 750 MG/1
10 TABLET, EXTENDED RELEASE ORAL DAILY
Qty: 30 TABLET | Refills: 0 | Status: SHIPPED | OUTPATIENT
Start: 2023-09-07

## 2023-11-03 DIAGNOSIS — R97.20 ELEVATED PSA: Primary | ICD-10-CM

## 2023-11-06 ENCOUNTER — OFFICE VISIT (OUTPATIENT)
Dept: ONCOLOGY | Age: 53
End: 2023-11-06
Payer: COMMERCIAL

## 2023-11-06 ENCOUNTER — HOSPITAL ENCOUNTER (OUTPATIENT)
Dept: LAB | Age: 53
Discharge: HOME | End: 2023-11-09
Payer: COMMERCIAL

## 2023-11-06 VITALS
HEART RATE: 75 BPM | HEIGHT: 68 IN | BODY MASS INDEX: 40.19 KG/M2 | TEMPERATURE: 98.3 F | SYSTOLIC BLOOD PRESSURE: 146 MMHG | OXYGEN SATURATION: 98 % | RESPIRATION RATE: 16 BRPM | DIASTOLIC BLOOD PRESSURE: 100 MMHG | WEIGHT: 265.2 LBS

## 2023-11-06 DIAGNOSIS — R97.20 ELEVATED PSA: ICD-10-CM

## 2023-11-06 DIAGNOSIS — R97.20 ELEVATED PSA: Primary | ICD-10-CM

## 2023-11-06 LAB — PSA SERPL-MCNC: 6.8 NG/ML

## 2023-11-06 PROCEDURE — 3080F DIAST BP >= 90 MM HG: CPT | Performed by: UROLOGY

## 2023-11-06 PROCEDURE — 36415 COLL VENOUS BLD VENIPUNCTURE: CPT

## 2023-11-06 PROCEDURE — 99213 OFFICE O/P EST LOW 20 MIN: CPT | Performed by: UROLOGY

## 2023-11-06 PROCEDURE — 84153 ASSAY OF PSA TOTAL: CPT

## 2023-11-06 PROCEDURE — 3077F SYST BP >= 140 MM HG: CPT | Performed by: UROLOGY

## 2023-11-06 RX ORDER — TERBINAFINE HYDROCHLORIDE 250 MG/1
250 TABLET ORAL DAILY
COMMUNITY
Start: 2023-09-27

## 2023-11-06 RX ORDER — EFINACONAZOLE 100 MG/ML
SOLUTION TOPICAL DAILY
COMMUNITY
Start: 2023-10-03

## 2023-11-06 ASSESSMENT — ENCOUNTER SYMPTOMS
RESPIRATORY NEGATIVE: 1
GASTROINTESTINAL NEGATIVE: 1

## 2023-11-06 ASSESSMENT — PATIENT HEALTH QUESTIONNAIRE - PHQ9
SUM OF ALL RESPONSES TO PHQ QUESTIONS 1-9: 0
2. FEELING DOWN, DEPRESSED OR HOPELESS: 0
SUM OF ALL RESPONSES TO PHQ9 QUESTIONS 1 & 2: 0
1. LITTLE INTEREST OR PLEASURE IN DOING THINGS: 0

## 2023-11-06 NOTE — PROGRESS NOTES
[facility]  [address] [place] [address]  [phone]        Mr. Malcom Hull is a 46 y.o. male with a diagnosis of elevated PSA, s/p trus fusion prostate bx on 4/13/2023, NEG. INTERVAL HISTORY: Patient is here today for follow-up of his elevated PSA. He underwent an MRI fusion biopsy of the prostate on 4/13/2023 it was negative for malignancy. His last PSA was 6.4. On recheck today it is roughly stable at 6.8. His gland measured 53 cc. His last prostate MRI was 3/7/2023 and he had a PI-RADS 3 lesion. He has no complaints today. He denies any hematuria or dysuria. His sister passed away last year from a malignancy. He has multiple uncles with a history of prostate cancer. From previous note:   Patient is here to follow-up after his MRI fusion guided biopsy of prostate on 4/13/2023. There was no evidence of malignancy on his biopsy. He has 2 regions of interest were negative as were all the systematic biopsies     His PSA had been 6.4 and his gland measured 53 cc. His digital rectal exam was unremarkable. He does have a family history of prostate cancer. Past medical, family and social histories, as well as medications and allergies, were reviewed and updated in the medical record as appropriate.     PMH:     Past Medical History:   Diagnosis Date    Dyslipidemia 5/8/2014    Elevated PSA     Family history of prostate cancer 5/8/2014    Fracture of right wrist 1979    HTN (hypertension) 5/8/2014    controlled with med    Hyperlipidemia     QUIRINO (obstructive sleep apnea) 5/8/2014    wears cpap at hs    Prolonged emergence from general anesthesia 2018    with colonscopy--per pt-- no issues with last colonscopy 1/2023       MEDs:     atorvastatin  benazepril  cetirizine  [employer] Melatonin Tabs  fluticasone  Jublia Soln  potassium chloride  terbinafine     ALLERGIES:    Allergies   Allergen Reactions    Bee Venom Anaphylaxis

## 2023-11-06 NOTE — PATIENT INSTRUCTIONS
Patient Instructions from Today's Visit    Reason for Visit:  Follow up     Diagnosis Information:  [email or web address] net/about-us/asco-answers-patient-education-materials/exsv-entsthx-hqny-sheets    Plan:  -Your PSA is staying in the same general range that is has been.  -Dr. Hanna Cano would like to just continue to monitor your PSA routinely. Follow Up:  Six months with Dr. Osmar Montgomery on 11/06/2023   Component Date Value Ref Range Status    PSA 11/06/2023 6.8 (H)  <4.0 ng/mL Final    Comment: [employer]. New method in use, please reestablish patient baseline  [employer] [place] LOCI technology. Patient's results of tumor marker testing may not be comparable to labs using different manufacturers/methods. Treatment Summary has been discussed and given to patient: N/A        -------------------------------------------------------------------------------------------------------------------  Please call our office at [phone] if you have any  of the following symptoms:   Fever of 100.5 or greater  Chills  Shortness of breath  Swelling or pain in one leg    After office hours an answering service is available and will contact a provider for emergencies or if you are experiencing any of the above symptoms. Patient does express an interest in My Chart. My Chart log in information explained on the after visit summary printout at the [address] desk.     Domingo Green MA

## 2023-11-17 NOTE — PROGRESS NOTES
[address] [place] [address]  [phone]    Patient Name:  Eleazar Trejo  YOB: 1970      Office Visit 11/20/2023    CHIEF COMPLAINT:    Chief Complaint   Patient presents with    Sleep Apnea         HISTORY OF PRESENT ILLNESS: Patient is a 50-year-old male who is being seen today for follow-up of QUIRINO. Diagnostic sleep study on 11/20/2022 with an AHI of 46.5 and lowest oxygen saturation 81%. He is prescribed cpap therapy with a humidifier set at 12-16 cm with a full face mask. Most recent download reveals AHI on PAP therapy is 6.5, leak is median 8.4 and 54.3 at 95th percentile and the hourly usage is 5 hours 11 minutes nightly. The overall use is 715 hours with days greater than four hours at 119/184. The patient is compliant with the Pap therapy and is feeling better as a result. His compliance with CPAP has improved over the last month. Prior to that his daily usage was sporadic but he reports that he has really focused on using his CPAP every night and is feeling better as result. States that he awakens refreshed and his daytime sleepiness has resolved. [place] score is 4/24. He does report that he is having some difficulty initiating and maintaining sleep at night and has been out of his melatonin. He request a prescription for melatonin be sent to the pharmacy. He denies any major medical changes over the last 6 months. Reports that his weight is consistently been around 265 pounds. We did discuss decreasing carbohydrates in his diet and increasing his activity by walking to help with weight reduction. His blood pressure is slightly elevated today. I did advise him to monitor his blood pressure closely at home and if it remains elevated to follow-up with his primary care provider.         [place] Sleepiness Scale:         11/20/2023     3:25 PM 5/17/2023     3:46 PM 2/15/2023    11:07 AM   Sleep Medicine   Sitting and reading 0 0

## 2023-11-20 ENCOUNTER — OFFICE VISIT (OUTPATIENT)
Dept: SLEEP MEDICINE | Age: 53
End: 2023-11-20
Payer: COMMERCIAL

## 2023-11-20 ENCOUNTER — TELEPHONE (OUTPATIENT)
Dept: SLEEP MEDICINE | Age: 53
End: 2023-11-20

## 2023-11-20 VITALS
DIASTOLIC BLOOD PRESSURE: 90 MMHG | HEIGHT: 68 IN | WEIGHT: 265 LBS | TEMPERATURE: 97.2 F | OXYGEN SATURATION: 96 % | BODY MASS INDEX: 40.16 KG/M2 | SYSTOLIC BLOOD PRESSURE: 140 MMHG | HEART RATE: 96 BPM

## 2023-11-20 DIAGNOSIS — G47.00 PERSISTENT DISORDER OF INITIATING OR MAINTAINING SLEEP: ICD-10-CM

## 2023-11-20 DIAGNOSIS — G47.33 OSA (OBSTRUCTIVE SLEEP APNEA): Primary | ICD-10-CM

## 2023-11-20 DIAGNOSIS — E66.01 CLASS 3 SEVERE OBESITY DUE TO EXCESS CALORIES WITHOUT SERIOUS COMORBIDITY WITH BODY MASS INDEX (BMI) OF 40.0 TO 44.9 IN ADULT (HCC): ICD-10-CM

## 2023-11-20 PROCEDURE — 3077F SYST BP >= 140 MM HG: CPT | Performed by: NURSE PRACTITIONER

## 2023-11-20 PROCEDURE — 99213 OFFICE O/P EST LOW 20 MIN: CPT | Performed by: NURSE PRACTITIONER

## 2023-11-20 PROCEDURE — 3080F DIAST BP >= 90 MM HG: CPT | Performed by: NURSE PRACTITIONER

## 2023-11-20 RX ORDER — PHENOL 1.4 %
5 AEROSOL, SPRAY (ML) MUCOUS MEMBRANE NIGHTLY PRN
Qty: 15 TABLET | Refills: 5 | Status: SHIPPED | OUTPATIENT
Start: 2023-11-20 | End: 2023-12-20

## 2023-11-20 ASSESSMENT — SLEEP AND FATIGUE QUESTIONNAIRES
ESS TOTAL SCORE: 4
HOW LIKELY ARE YOU TO NOD OFF OR FALL ASLEEP WHILE LYING DOWN TO REST IN THE AFTERNOON WHEN CIRCUMSTANCES PERMIT: 2
HOW LIKELY ARE YOU TO NOD OFF OR FALL ASLEEP WHILE SITTING QUIETLY AFTER LUNCH WITHOUT ALCOHOL: 0
HOW LIKELY ARE YOU TO NOD OFF OR FALL ASLEEP WHEN YOU ARE A PASSENGER IN A CAR FOR AN HOUR WITHOUT A BREAK: 1
HOW LIKELY ARE YOU TO NOD OFF OR FALL ASLEEP IN A CAR, WHILE STOPPED FOR A FEW MINUTES IN TRAFFIC: 0
HOW LIKELY ARE YOU TO NOD OFF OR FALL ASLEEP WHILE SITTING AND TALKING TO SOMEONE: 0
HOW LIKELY ARE YOU TO NOD OFF OR FALL ASLEEP WHILE SITTING AND READING: 0
HOW LIKELY ARE YOU TO NOD OFF OR FALL ASLEEP WHILE WATCHING TV: 1
HOW LIKELY ARE YOU TO NOD OFF OR FALL ASLEEP WHILE SITTING INACTIVE IN A PUBLIC PLACE: 0

## 2023-11-20 NOTE — PATIENT INSTRUCTIONS
Continue CPAP at new pressure settings of 13-17 cm H2O with nightly compliance  New supplies ordered  Recommendations as above  Follow-up in 6 months or sooner if needed

## 2023-12-04 ASSESSMENT — PATIENT HEALTH QUESTIONNAIRE - PHQ9
2. FEELING DOWN, DEPRESSED OR HOPELESS: 0
SUM OF ALL RESPONSES TO PHQ QUESTIONS 1-9: 0
SUM OF ALL RESPONSES TO PHQ QUESTIONS 1-9: 0
2. FEELING DOWN, DEPRESSED OR HOPELESS: NOT AT ALL
SUM OF ALL RESPONSES TO PHQ9 QUESTIONS 1 & 2: 0
1. LITTLE INTEREST OR PLEASURE IN DOING THINGS: NOT AT ALL
SUM OF ALL RESPONSES TO PHQ9 QUESTIONS 1 & 2: 0
SUM OF ALL RESPONSES TO PHQ QUESTIONS 1-9: 0
SUM OF ALL RESPONSES TO PHQ QUESTIONS 1-9: 0
1. LITTLE INTEREST OR PLEASURE IN DOING THINGS: 0

## 2023-12-05 ENCOUNTER — OFFICE VISIT (OUTPATIENT)
Dept: FAMILY MEDICINE CLINIC | Facility: CLINIC | Age: 53
End: 2023-12-05
Payer: COMMERCIAL

## 2023-12-05 VITALS
SYSTOLIC BLOOD PRESSURE: 130 MMHG | TEMPERATURE: 96.9 F | WEIGHT: 259 LBS | DIASTOLIC BLOOD PRESSURE: 80 MMHG | BODY MASS INDEX: 39.25 KG/M2 | OXYGEN SATURATION: 98 % | RESPIRATION RATE: 16 BRPM | HEART RATE: 89 BPM | HEIGHT: 68 IN

## 2023-12-05 DIAGNOSIS — E87.6 HYPOKALEMIA: ICD-10-CM

## 2023-12-05 DIAGNOSIS — E78.2 MIXED HYPERLIPIDEMIA: Primary | ICD-10-CM

## 2023-12-05 DIAGNOSIS — Z13.1 DIABETES MELLITUS SCREENING: ICD-10-CM

## 2023-12-05 DIAGNOSIS — J30.1 NON-SEASONAL ALLERGIC RHINITIS DUE TO POLLEN: ICD-10-CM

## 2023-12-05 DIAGNOSIS — E66.01 MORBID OBESITY DUE TO EXCESS CALORIES (HCC): ICD-10-CM

## 2023-12-05 DIAGNOSIS — I10 HYPERTENSION, ESSENTIAL, BENIGN: ICD-10-CM

## 2023-12-05 LAB
ALBUMIN SERPL-MCNC: 4 G/DL (ref 3.5–5)
ALBUMIN/GLOB SERPL: 1.1 (ref 0.4–1.6)
ALP SERPL-CCNC: 57 U/L (ref 50–136)
ALT SERPL-CCNC: 83 U/L (ref 12–65)
ANION GAP SERPL CALC-SCNC: 6 MMOL/L (ref 2–11)
AST SERPL-CCNC: 118 U/L (ref 15–37)
BASOPHILS # BLD: 0.1 K/UL (ref 0–0.2)
BASOPHILS NFR BLD: 1 % (ref 0–2)
BILIRUB SERPL-MCNC: 0.5 MG/DL (ref 0.2–1.1)
BUN SERPL-MCNC: 14 MG/DL (ref 6–23)
CALCIUM SERPL-MCNC: 9.4 MG/DL (ref 8.3–10.4)
CHLORIDE SERPL-SCNC: 106 MMOL/L (ref 103–113)
CHOLEST SERPL-MCNC: 139 MG/DL
CO2 SERPL-SCNC: 26 MMOL/L (ref 21–32)
CREAT SERPL-MCNC: 1.4 MG/DL (ref 0.8–1.5)
DIFFERENTIAL METHOD BLD: ABNORMAL
EOSINOPHIL # BLD: 0.1 K/UL (ref 0–0.8)
EOSINOPHIL NFR BLD: 2 % (ref 0.5–7.8)
ERYTHROCYTE [DISTWIDTH] IN BLOOD BY AUTOMATED COUNT: 13.2 % (ref 11.9–14.6)
GLOBULIN SER CALC-MCNC: 3.8 G/DL (ref 2.8–4.5)
GLUCOSE SERPL-MCNC: 116 MG/DL (ref 65–100)
HCT VFR BLD AUTO: 50.5 % (ref 41.1–50.3)
HDLC SERPL-MCNC: 49 MG/DL (ref 40–60)
HDLC SERPL: 2.8
HGB BLD-MCNC: 17 G/DL (ref 13.6–17.2)
IMM GRANULOCYTES # BLD AUTO: 0 K/UL (ref 0–0.5)
IMM GRANULOCYTES NFR BLD AUTO: 0 % (ref 0–5)
LDLC SERPL CALC-MCNC: 82.4 MG/DL
LYMPHOCYTES # BLD: 1.4 K/UL (ref 0.5–4.6)
LYMPHOCYTES NFR BLD: 31 % (ref 13–44)
MCH RBC QN AUTO: 28.7 PG (ref 26.1–32.9)
MCHC RBC AUTO-ENTMCNC: 33.7 G/DL (ref 31.4–35)
MCV RBC AUTO: 85.2 FL (ref 82–102)
MONOCYTES # BLD: 0.3 K/UL (ref 0.1–1.3)
MONOCYTES NFR BLD: 6 % (ref 4–12)
NEUTS SEG # BLD: 2.7 K/UL (ref 1.7–8.2)
NEUTS SEG NFR BLD: 60 % (ref 43–78)
NRBC # BLD: 0 K/UL (ref 0–0.2)
PLATELET # BLD AUTO: 203 K/UL (ref 150–450)
PMV BLD AUTO: 12.1 FL (ref 9.4–12.3)
POTASSIUM SERPL-SCNC: 3.2 MMOL/L (ref 3.5–5.1)
PROT SERPL-MCNC: 7.8 G/DL (ref 6.3–8.2)
RBC # BLD AUTO: 5.93 M/UL (ref 4.23–5.6)
SODIUM SERPL-SCNC: 138 MMOL/L (ref 136–146)
TRIGL SERPL-MCNC: 38 MG/DL (ref 35–150)
VLDLC SERPL CALC-MCNC: 7.6 MG/DL (ref 6–23)
WBC # BLD AUTO: 4.5 K/UL (ref 4.3–11.1)

## 2023-12-05 PROCEDURE — 3075F SYST BP GE 130 - 139MM HG: CPT | Performed by: FAMILY MEDICINE

## 2023-12-05 PROCEDURE — 3079F DIAST BP 80-89 MM HG: CPT | Performed by: FAMILY MEDICINE

## 2023-12-05 PROCEDURE — 99214 OFFICE O/P EST MOD 30 MIN: CPT | Performed by: FAMILY MEDICINE

## 2023-12-05 RX ORDER — POTASSIUM CHLORIDE 750 MG/1
10 TABLET, EXTENDED RELEASE ORAL DAILY
Qty: 90 TABLET | Refills: 1 | Status: CANCELLED | OUTPATIENT
Start: 2023-12-05

## 2023-12-05 RX ORDER — BENAZEPRIL HYDROCHLORIDE 20 MG/1
20 TABLET ORAL DAILY
Qty: 90 TABLET | Refills: 1 | Status: SHIPPED | OUTPATIENT
Start: 2023-12-05

## 2023-12-05 RX ORDER — FLUTICASONE PROPIONATE 50 MCG
2 SPRAY, SUSPENSION (ML) NASAL DAILY PRN
Qty: 16 EACH | Refills: 2 | Status: SHIPPED | OUTPATIENT
Start: 2023-12-05

## 2023-12-05 RX ORDER — ATORVASTATIN CALCIUM 10 MG/1
10 TABLET, FILM COATED ORAL NIGHTLY
Qty: 90 TABLET | Refills: 1 | Status: SHIPPED | OUTPATIENT
Start: 2023-12-05

## 2023-12-05 ASSESSMENT — ENCOUNTER SYMPTOMS
NAUSEA: 0
COUGH: 0
PHOTOPHOBIA: 0
DIARRHEA: 0
SORE THROAT: 0
SHORTNESS OF BREATH: 0
VOICE CHANGE: 0
WHEEZING: 0
SINUS PAIN: 0
ABDOMINAL PAIN: 0
VOMITING: 0

## 2023-12-05 NOTE — PROGRESS NOTES
Geeta Rothman (:  1970) is a 46 y.o. male,Established patient, here for evaluation of the following chief complaint(s):  Medication Refill and Labs Only         ASSESSMENT/PLAN:  1. Mixed hyperlipidemia  -     atorvastatin (LIPITOR) 10 MG tablet; Take 1 tablet by mouth at bedtime, Disp-90 tablet, R-1Normal  -     Comprehensive Metabolic Panel; Future  -     Lipid Panel; Future  2. Hypertension, essential, benign  -     benazepril (LOTENSIN) 20 MG tablet; Take 1 tablet by mouth daily TAKE 1 TAB BY MOUTH DAILY. , Disp-90 tablet, R-1Normal  -     CBC with Auto Differential; Future  -     Comprehensive Metabolic Panel; Future  3. Non-seasonal allergic rhinitis due to pollen  -     fluticasone (FLONASE) 50 MCG/ACT nasal spray; 2 sprays by Nasal route daily as needed for Allergies (prn), Disp-16 each, R-2Normal  4. Hypokalemia  -     Comprehensive Metabolic Panel; Future  5. Diabetes mellitus screening  -     Hemoglobin A1C; Future  6. Morbid obesity due to excess calories ([address])    Await labs, to continue to avoid salt and fatty foods. Consider restarting Klor Con if potassium is again low. Continue to use Saline NS at HS, may use Zyrtec and Flonase NS prn. Strongly advised patient to continue to lose weight. Also advised to exercise regularly, to eat healthy foods, and to control portion sizes. Return in about 6 months (around 2024) for medication refills, fasting labs or prn sooner. Subjective   SUBJECTIVE/OBJECTIVE:  Medication Refill  Pertinent negatives include no abdominal pain, chest pain, chills, congestion, coughing, fever, headaches, nausea, sore throat or vomiting. Patient comes today for a 6 month follow-up, is fasting for labs, denies any side effects from his medicines, denies any associated symptoms. Says he was diagnosed with severe Sleep Apnea, was not using his old machine, got a new machine, had an appointment last month.  He is now taking Melatonin 5 mg as needed-

## 2023-12-06 LAB
EST. AVERAGE GLUCOSE BLD GHB EST-MCNC: 126 MG/DL
HBA1C MFR BLD: 6 % (ref 4.8–5.6)

## 2024-01-04 PROBLEM — Z13.1 DIABETES MELLITUS SCREENING: Status: RESOLVED | Noted: 2023-12-05 | Resolved: 2024-01-04

## 2024-01-09 ENCOUNTER — NURSE ONLY (OUTPATIENT)
Dept: FAMILY MEDICINE CLINIC | Facility: CLINIC | Age: 54
End: 2024-01-09

## 2024-01-09 DIAGNOSIS — E87.6 HYPOKALEMIA: ICD-10-CM

## 2024-01-09 LAB
ALBUMIN SERPL-MCNC: 4.2 G/DL (ref 3.5–5)
ALBUMIN/GLOB SERPL: 1.3 (ref 0.4–1.6)
ALP SERPL-CCNC: 52 U/L (ref 50–136)
ALT SERPL-CCNC: 56 U/L (ref 12–65)
ANION GAP SERPL CALC-SCNC: 7 MMOL/L (ref 2–11)
AST SERPL-CCNC: 35 U/L (ref 15–37)
BILIRUB SERPL-MCNC: 0.4 MG/DL (ref 0.2–1.1)
BUN SERPL-MCNC: 10 MG/DL (ref 6–23)
CALCIUM SERPL-MCNC: 9.5 MG/DL (ref 8.3–10.4)
CHLORIDE SERPL-SCNC: 107 MMOL/L (ref 103–113)
CO2 SERPL-SCNC: 26 MMOL/L (ref 21–32)
CREAT SERPL-MCNC: 1.4 MG/DL (ref 0.8–1.5)
GLOBULIN SER CALC-MCNC: 3.3 G/DL (ref 2.8–4.5)
GLUCOSE SERPL-MCNC: 76 MG/DL (ref 65–100)
POTASSIUM SERPL-SCNC: 3.7 MMOL/L (ref 3.5–5.1)
PROT SERPL-MCNC: 7.5 G/DL (ref 6.3–8.2)
SODIUM SERPL-SCNC: 140 MMOL/L (ref 136–146)

## 2024-05-02 DIAGNOSIS — R97.20 ELEVATED PSA: Primary | ICD-10-CM

## 2024-05-03 DIAGNOSIS — R97.20 ELEVATED PSA: Primary | ICD-10-CM

## 2024-05-03 NOTE — PROGRESS NOTES
Urologic Oncology  [facility] Hematology & Oncology  [address] [place] [id number]  [phone]        Mr. Pio Rodriguez is a 54 y.o. male with a diagnosis of elevated PSA, s/p trus fusion prostate bx on 4/13/2023, NEG.     INTERVAL HISTORY:    Patient returns today for follow-up evaluation of elevated PSA.  He previously had a PSA of 6.4 in February 2023.  He underwent MRI pelvis with and without contrast on 3/7/2023 with a PI-RADS 3 lesion.  Prostate biopsy on 4/13/2023 was negative.  His repeat PSA in November 2023 was up to 6.8.  Repeat PSA today is 4.5.    Patient denies any significant lower urinary tract symptoms since his last visit.  He denies any dysuria or hematuria.  No significant urinary urgency or frequency.    From previous note (11/6/23):  Patient is here today for follow-up of his elevated PSA.  He underwent an MRI fusion biopsy of the prostate on 4/13/2023 it was negative for malignancy.  His last PSA was 6.4.  On recheck today it is roughly stable at 6.8.  His gland measured 53 cc.  His last prostate MRI was 3/7/2023 and he had a PI-RADS 3 lesion.     He has no complaints today.  He denies any hematuria or dysuria.  His sister passed away last year from a malignancy.  He has multiple uncles with a history of prostate cancer.    Past medical, family and social histories, as well as medications and allergies, were reviewed and updated in the medical record as appropriate.    PMH:     Past Medical History:   Diagnosis Date    Dyslipidemia 5/8/2014    Elevated PSA     Family history of prostate cancer 5/8/2014    Fracture of right wrist 1979    HTN (hypertension) 5/8/2014    controlled with med    Hyperlipidemia     QUIRINO (obstructive sleep apnea) 5/8/2014    wears cpap at hs    Prolonged emergence from general anesthesia 2018    with colonscopy--per pt-- no issues with last colonscopy 1/2023       MEDs:     atorvastatin  benazepril  cetirizine  fluticasone  Jublia Soln  potassium

## 2024-05-06 ENCOUNTER — OFFICE VISIT (OUTPATIENT)
Dept: ONCOLOGY | Age: 54
End: 2024-05-06
Payer: COMMERCIAL

## 2024-05-06 ENCOUNTER — HOSPITAL ENCOUNTER (OUTPATIENT)
Dept: LAB | Age: 54
Discharge: HOME | End: 2024-05-09
Payer: COMMERCIAL

## 2024-05-06 VITALS
DIASTOLIC BLOOD PRESSURE: 89 MMHG | HEART RATE: 54 BPM | RESPIRATION RATE: 18 BRPM | TEMPERATURE: 98.2 F | BODY MASS INDEX: 39.43 KG/M2 | OXYGEN SATURATION: 97 % | WEIGHT: 259.3 LBS | SYSTOLIC BLOOD PRESSURE: 128 MMHG

## 2024-05-06 DIAGNOSIS — R97.20 ELEVATED PSA: Primary | ICD-10-CM

## 2024-05-06 DIAGNOSIS — R97.20 ELEVATED PSA: ICD-10-CM

## 2024-05-06 PROCEDURE — 84153 ASSAY OF PSA TOTAL: CPT

## 2024-05-06 PROCEDURE — 36415 COLL VENOUS BLD VENIPUNCTURE: CPT

## 2024-05-06 PROCEDURE — 3074F SYST BP LT 130 MM HG: CPT | Performed by: UROLOGY

## 2024-05-06 PROCEDURE — 84154 ASSAY OF PSA FREE: CPT

## 2024-05-06 PROCEDURE — 3079F DIAST BP 80-89 MM HG: CPT | Performed by: UROLOGY

## 2024-05-06 PROCEDURE — 99213 OFFICE O/P EST LOW 20 MIN: CPT | Performed by: UROLOGY

## 2024-05-06 ASSESSMENT — PATIENT HEALTH QUESTIONNAIRE - PHQ9
SUM OF ALL RESPONSES TO PHQ QUESTIONS 1-9: 0
2. FEELING DOWN, DEPRESSED OR HOPELESS: NOT AT ALL
SUM OF ALL RESPONSES TO PHQ QUESTIONS 1-9: 0
SUM OF ALL RESPONSES TO PHQ QUESTIONS 1-9: 0
SUM OF ALL RESPONSES TO PHQ9 QUESTIONS 1 & 2: 0
SUM OF ALL RESPONSES TO PHQ QUESTIONS 1-9: 0
1. LITTLE INTEREST OR PLEASURE IN DOING THINGS: NOT AT ALL

## 2024-05-07 LAB
PSA FREE MFR SERPL: 9.3 %
PSA FREE SERPL-MCNC: 0.4 NG/ML
PSA SERPL-MCNC: 4.5 NG/ML (ref 0–4)

## 2024-05-21 ENCOUNTER — OFFICE VISIT (OUTPATIENT)
Dept: SLEEP MEDICINE | Age: 54
End: 2024-05-21
Payer: COMMERCIAL

## 2024-05-21 VITALS
HEART RATE: 84 BPM | HEIGHT: 68 IN | DIASTOLIC BLOOD PRESSURE: 79 MMHG | WEIGHT: 222 LBS | OXYGEN SATURATION: 97 % | SYSTOLIC BLOOD PRESSURE: 129 MMHG | BODY MASS INDEX: 33.65 KG/M2

## 2024-05-21 DIAGNOSIS — E66.9 OBESITY (BMI 35.0-39.9 WITHOUT COMORBIDITY): ICD-10-CM

## 2024-05-21 DIAGNOSIS — G47.33 OSA (OBSTRUCTIVE SLEEP APNEA): Primary | ICD-10-CM

## 2024-05-21 DIAGNOSIS — G47.00 PERSISTENT DISORDER OF INITIATING OR MAINTAINING SLEEP: ICD-10-CM

## 2024-05-21 PROCEDURE — 3078F DIAST BP <80 MM HG: CPT | Performed by: NURSE PRACTITIONER

## 2024-05-21 PROCEDURE — 3074F SYST BP LT 130 MM HG: CPT | Performed by: NURSE PRACTITIONER

## 2024-05-21 PROCEDURE — 99213 OFFICE O/P EST LOW 20 MIN: CPT | Performed by: NURSE PRACTITIONER

## 2024-05-21 RX ORDER — AMOXICILLIN AND CLAVULANATE POTASSIUM 875; 125 MG/1; MG/1
1 TABLET, FILM COATED ORAL 2 TIMES DAILY
COMMUNITY
Start: 2024-05-15 | End: 2024-05-22

## 2024-05-21 ASSESSMENT — SLEEP AND FATIGUE QUESTIONNAIRES
HOW LIKELY ARE YOU TO NOD OFF OR FALL ASLEEP IN A CAR, WHILE STOPPED FOR A FEW MINUTES IN TRAFFIC: WOULD NEVER DOZE
HOW LIKELY ARE YOU TO NOD OFF OR FALL ASLEEP WHILE SITTING INACTIVE IN A PUBLIC PLACE: WOULD NEVER DOZE
HOW LIKELY ARE YOU TO NOD OFF OR FALL ASLEEP WHILE SITTING AND READING: WOULD NEVER DOZE
HOW LIKELY ARE YOU TO NOD OFF OR FALL ASLEEP WHILE WATCHING TV: WOULD NEVER DOZE
ESS TOTAL SCORE: 1
HOW LIKELY ARE YOU TO NOD OFF OR FALL ASLEEP WHILE SITTING AND TALKING TO SOMEONE: WOULD NEVER DOZE
HOW LIKELY ARE YOU TO NOD OFF OR FALL ASLEEP WHILE SITTING QUIETLY AFTER LUNCH WITHOUT ALCOHOL: WOULD NEVER DOZE
HOW LIKELY ARE YOU TO NOD OFF OR FALL ASLEEP WHEN YOU ARE A PASSENGER IN A CAR FOR AN HOUR WITHOUT A BREAK: WOULD NEVER DOZE
HOW LIKELY ARE YOU TO NOD OFF OR FALL ASLEEP WHILE LYING DOWN TO REST IN THE AFTERNOON WHEN CIRCUMSTANCES PERMIT: SLIGHT CHANCE OF DOZING

## 2024-05-21 NOTE — PROGRESS NOTES
[facility]  [address]  [place] [address]  [phone]    Patient Name:  Pio Rodriguez  YOB: 1970      Office Visit 5/21/2024    CHIEF COMPLAINT:    Chief Complaint   Patient presents with    Sleep Apnea    Follow-up         HISTORY OF PRESENT ILLNESS:  Patient is a 55 yo male seen today for follow up of QUIRINO.  Diagnostic sleep study on 11/20/2022 with an AHI of 46.5 and lowest oxygen saturation of 81%. He is prescribed cpap therapy with a humidifier set at 13-17 cm with a full face mask. Most recent download reveals AHI on PAP therapy is 5.6, leak is median 11.6 and 80.4 at 95th percentile and the hourly usage is 5 hours 35 minutes nightly. The overall use is 786 hours with days greater than four hours at 133/182. The patient is compliant with the Pap therapy and is feeling better as a result.  His compliance with CPAP is overall good.  He does have an elevated mask leak but reports he thinks the majority of that happened when he needed to change mask due to losing weight.  He does report he has successfully lost about 40 pounds over the last year.  States that when he wears CPAP he awakens fully refreshed in the mornings and denies any excessive daytime sleepiness or fatigue.  [place] score is 1/24.  He does occasionally take melatonin as needed to help with sleep initiation.  He denies any major medical changes over the last year.  His blood pressure is well-controlled today.      [place] Sleepiness Scale      5/21/2024     1:35 PM 11/20/2023     3:25 PM 5/17/2023     3:46 PM 2/15/2023    11:07 AM   Sleep Medicine   Sitting and reading 0 0 0 0   Watching TV 0 1 0 1   Sitting, inactive in a public place (e.g. a theatre or a meeting) 0 0 0 1   As a passenger in a car for an hour without a break 0 1 1 1   Lying down to rest in the afternoon when circumstances permit 1 2 3 2   Sitting and talking to someone 0 0 0 0   Sitting quietly after a lunch without

## 2024-05-21 NOTE — PATIENT INSTRUCTIONS
Continue CPAP 13-17 cm H2O with nightly compliance  New CPAP supplies ordered  Recommendations as above  Follow-up in 1 year or sooner if needed

## 2024-06-04 SDOH — ECONOMIC STABILITY: HOUSING INSECURITY
IN THE LAST 12 MONTHS, WAS THERE A TIME WHEN YOU DID NOT HAVE A STEADY PLACE TO SLEEP OR SLEPT IN A SHELTER (INCLUDING NOW)?: NO

## 2024-06-04 SDOH — ECONOMIC STABILITY: INCOME INSECURITY: HOW HARD IS IT FOR YOU TO PAY FOR THE VERY BASICS LIKE FOOD, HOUSING, MEDICAL CARE, AND HEATING?: NOT VERY HARD

## 2024-06-04 SDOH — ECONOMIC STABILITY: FOOD INSECURITY: WITHIN THE PAST 12 MONTHS, THE FOOD YOU BOUGHT JUST DIDN'T LAST AND YOU DIDN'T HAVE MONEY TO GET MORE.: NEVER TRUE

## 2024-06-04 SDOH — ECONOMIC STABILITY: FOOD INSECURITY: WITHIN THE PAST 12 MONTHS, YOU WORRIED THAT YOUR FOOD WOULD RUN OUT BEFORE YOU GOT MONEY TO BUY MORE.: NEVER TRUE

## 2024-06-04 SDOH — ECONOMIC STABILITY: TRANSPORTATION INSECURITY
IN THE PAST 12 MONTHS, HAS LACK OF TRANSPORTATION KEPT YOU FROM MEETINGS, WORK, OR FROM GETTING THINGS NEEDED FOR DAILY LIVING?: NO

## 2024-06-05 ENCOUNTER — OFFICE VISIT (OUTPATIENT)
Dept: FAMILY MEDICINE CLINIC | Facility: CLINIC | Age: 54
End: 2024-06-05
Payer: COMMERCIAL

## 2024-06-05 VITALS
OXYGEN SATURATION: 98 % | BODY MASS INDEX: 33.42 KG/M2 | TEMPERATURE: 97.6 F | SYSTOLIC BLOOD PRESSURE: 126 MMHG | HEIGHT: 68 IN | RESPIRATION RATE: 16 BRPM | WEIGHT: 220.5 LBS | HEART RATE: 79 BPM | DIASTOLIC BLOOD PRESSURE: 80 MMHG

## 2024-06-05 DIAGNOSIS — E78.2 MIXED HYPERLIPIDEMIA: Chronic | ICD-10-CM

## 2024-06-05 DIAGNOSIS — E87.6 HYPOKALEMIA: Chronic | ICD-10-CM

## 2024-06-05 DIAGNOSIS — I10 HYPERTENSION, ESSENTIAL, BENIGN: Chronic | ICD-10-CM

## 2024-06-05 DIAGNOSIS — R73.03 PREDIABETES: Chronic | ICD-10-CM

## 2024-06-05 DIAGNOSIS — J30.1 NON-SEASONAL ALLERGIC RHINITIS DUE TO POLLEN: Chronic | ICD-10-CM

## 2024-06-05 DIAGNOSIS — E66.09 CLASS 1 OBESITY DUE TO EXCESS CALORIES WITH SERIOUS COMORBIDITY AND BODY MASS INDEX (BMI) OF 33.0 TO 33.9 IN ADULT: ICD-10-CM

## 2024-06-05 DIAGNOSIS — E78.2 MIXED HYPERLIPIDEMIA: Primary | Chronic | ICD-10-CM

## 2024-06-05 LAB
ALBUMIN SERPL-MCNC: 4.3 G/DL (ref 3.5–5)
ALBUMIN/GLOB SERPL: 1.6 (ref 1–1.9)
ALP SERPL-CCNC: 59 U/L (ref 40–129)
ALT SERPL-CCNC: 44 U/L (ref 12–65)
ANION GAP SERPL CALC-SCNC: 10 MMOL/L (ref 9–18)
AST SERPL-CCNC: 37 U/L (ref 15–37)
BASOPHILS # BLD: 0.1 K/UL (ref 0–0.2)
BASOPHILS NFR BLD: 1 % (ref 0–2)
BILIRUB SERPL-MCNC: 0.6 MG/DL (ref 0–1.2)
BUN SERPL-MCNC: 7 MG/DL (ref 6–23)
CALCIUM SERPL-MCNC: 9.4 MG/DL (ref 8.8–10.2)
CHLORIDE SERPL-SCNC: 103 MMOL/L (ref 98–107)
CHOLEST SERPL-MCNC: 141 MG/DL (ref 0–200)
CO2 SERPL-SCNC: 26 MMOL/L (ref 20–28)
CREAT SERPL-MCNC: 1.32 MG/DL (ref 0.8–1.3)
DIFFERENTIAL METHOD BLD: ABNORMAL
EOSINOPHIL # BLD: 0.2 K/UL (ref 0–0.8)
EOSINOPHIL NFR BLD: 5 % (ref 0.5–7.8)
ERYTHROCYTE [DISTWIDTH] IN BLOOD BY AUTOMATED COUNT: 13.2 % (ref 11.9–14.6)
EST. AVERAGE GLUCOSE BLD GHB EST-MCNC: 119 MG/DL
GLOBULIN SER CALC-MCNC: 2.7 G/DL (ref 2.3–3.5)
GLUCOSE SERPL-MCNC: 89 MG/DL (ref 70–99)
HBA1C MFR BLD: 5.8 % (ref 0–5.6)
HCT VFR BLD AUTO: 49.1 % (ref 41.1–50.3)
HDLC SERPL-MCNC: 60 MG/DL (ref 40–60)
HDLC SERPL: 2.3 (ref 0–5)
HGB BLD-MCNC: 15.9 G/DL (ref 13.6–17.2)
IMM GRANULOCYTES # BLD AUTO: 0 K/UL (ref 0–0.5)
IMM GRANULOCYTES NFR BLD AUTO: 0 % (ref 0–5)
LDLC SERPL CALC-MCNC: 73 MG/DL (ref 0–100)
LYMPHOCYTES # BLD: 1.8 K/UL (ref 0.5–4.6)
LYMPHOCYTES NFR BLD: 49 % (ref 13–44)
MCH RBC QN AUTO: 28.4 PG (ref 26.1–32.9)
MCHC RBC AUTO-ENTMCNC: 32.4 G/DL (ref 31.4–35)
MCV RBC AUTO: 87.8 FL (ref 82–102)
MONOCYTES # BLD: 0.2 K/UL (ref 0.1–1.3)
MONOCYTES NFR BLD: 6 % (ref 4–12)
NEUTS SEG # BLD: 1.5 K/UL (ref 1.7–8.2)
NEUTS SEG NFR BLD: 39 % (ref 43–78)
NRBC # BLD: 0 K/UL (ref 0–0.2)
PLATELET # BLD AUTO: 209 K/UL (ref 150–450)
POTASSIUM SERPL-SCNC: 4.2 MMOL/L (ref 3.5–5.1)
PROT SERPL-MCNC: 7 G/DL (ref 6.3–8.2)
RBC # BLD AUTO: 5.59 M/UL (ref 4.23–5.6)
SODIUM SERPL-SCNC: 139 MMOL/L (ref 136–145)
TRIGL SERPL-MCNC: 39 MG/DL (ref 0–150)
VLDLC SERPL CALC-MCNC: 8 MG/DL (ref 6–23)
WBC # BLD AUTO: 3.8 K/UL (ref 4.3–11.1)

## 2024-06-05 PROCEDURE — 3079F DIAST BP 80-89 MM HG: CPT | Performed by: FAMILY MEDICINE

## 2024-06-05 PROCEDURE — 3074F SYST BP LT 130 MM HG: CPT | Performed by: FAMILY MEDICINE

## 2024-06-05 PROCEDURE — 99214 OFFICE O/P EST MOD 30 MIN: CPT | Performed by: FAMILY MEDICINE

## 2024-06-05 RX ORDER — FLUTICASONE PROPIONATE 50 MCG
2 SPRAY, SUSPENSION (ML) NASAL DAILY PRN
Qty: 16 EACH | Refills: 2 | Status: SHIPPED | OUTPATIENT
Start: 2024-06-05

## 2024-06-05 RX ORDER — BENAZEPRIL HYDROCHLORIDE 20 MG/1
20 TABLET ORAL DAILY
Qty: 90 TABLET | Refills: 1 | Status: SHIPPED | OUTPATIENT
Start: 2024-06-05

## 2024-06-05 RX ORDER — ATORVASTATIN CALCIUM 10 MG/1
10 TABLET, FILM COATED ORAL NIGHTLY
Qty: 90 TABLET | Refills: 1 | Status: SHIPPED | OUTPATIENT
Start: 2024-06-05

## 2024-06-05 RX ORDER — CETIRIZINE HYDROCHLORIDE 10 MG/1
10 TABLET ORAL DAILY PRN
Qty: 90 TABLET | Refills: 1 | Status: SHIPPED | OUTPATIENT
Start: 2024-06-05

## 2024-06-05 RX ORDER — POTASSIUM CHLORIDE 750 MG/1
10 TABLET, EXTENDED RELEASE ORAL DAILY
Qty: 90 TABLET | Refills: 1 | Status: SHIPPED | OUTPATIENT
Start: 2024-06-05

## 2024-06-05 ASSESSMENT — ENCOUNTER SYMPTOMS
VOMITING: 0
SHORTNESS OF BREATH: 0
ABDOMINAL PAIN: 0
NAUSEA: 0
COUGH: 0
SORE THROAT: 0
SINUS PAIN: 0
WHEEZING: 0
DIARRHEA: 0
PHOTOPHOBIA: 0
VOICE CHANGE: 0

## 2024-06-05 ASSESSMENT — PATIENT HEALTH QUESTIONNAIRE - PHQ9
1. LITTLE INTEREST OR PLEASURE IN DOING THINGS: NOT AT ALL
SUM OF ALL RESPONSES TO PHQ QUESTIONS 1-9: 0
2. FEELING DOWN, DEPRESSED OR HOPELESS: NOT AT ALL
SUM OF ALL RESPONSES TO PHQ QUESTIONS 1-9: 0
SUM OF ALL RESPONSES TO PHQ QUESTIONS 1-9: 0
SUM OF ALL RESPONSES TO PHQ9 QUESTIONS 1 & 2: 0
SUM OF ALL RESPONSES TO PHQ QUESTIONS 1-9: 0

## 2024-06-05 NOTE — PROGRESS NOTES
Pio Rodriguez (:  1970) is a 54 y.o. male,Established patient, here for evaluation of the following chief complaint(s):  Medication Refill and Labs Only      Assessment & Plan   ASSESSMENT/PLAN:  1. Mixed hyperlipidemia  -     atorvastatin (LIPITOR) 10 MG tablet; Take 1 tablet by mouth at bedtime, Disp-90 tablet, R-1Normal  -     Comprehensive Metabolic Panel; Future  -     Lipid Panel; Future  2. Hypertension, essential, benign  -     benazepril (LOTENSIN) 20 MG tablet; Take 1 tablet by mouth daily TAKE 1 TAB BY MOUTH DAILY., Disp-90 tablet, R-1Normal  -     CBC with Auto Differential; Future  -     Comprehensive Metabolic Panel; Future  3. Non-seasonal allergic rhinitis due to pollen  -     cetirizine (ZYRTEC) 10 MG tablet; Take 1 tablet by mouth daily as needed for Allergies (prn), Disp-90 tablet, R-1Normal  -     fluticasone (FLONASE) 50 MCG/ACT nasal spray; 2 sprays by Nasal route daily as needed for Allergies (prn), Disp-16 each, R-2Normal  4. Hypokalemia  -     potassium chloride (KLOR-CON M) 10 MEQ extended release tablet; Take 1 tablet by mouth daily, Disp-90 tablet, R-1Normal  -     Comprehensive Metabolic Panel; Future  5. Prediabetes  -     Hemoglobin A1C; Future  6. Class 1 obesity due to excess calories with serious comorbidity and body mass index (BMI) of 33.0 to 33.9 in adult    Prediabetes- new diagnosis; await labs, to continue to minimize carbohydrates and sweets.  Hypokalemia- stable; continue current management.  HLP- well controlled, await labs, to continue to avoid fatty foods.  HTN- well controlled, continue current management.  Allergic rhinitis- stable; continue current management.  Obesity- Better. Strongly advised patient to continue to lose weight.  Also advised to exercise regularly, to eat healthy foods, and to control portion sizes.    Return in about 6 months (around 2024) for medication refills, fasting labs or prn sooner.         Subjective

## 2024-09-02 DIAGNOSIS — J30.1 NON-SEASONAL ALLERGIC RHINITIS DUE TO POLLEN: Chronic | ICD-10-CM

## 2024-09-04 RX ORDER — FLUTICASONE PROPIONATE 50 MCG
SPRAY, SUSPENSION (ML) NASAL
OUTPATIENT
Start: 2024-09-04

## 2024-09-11 ENCOUNTER — OFFICE VISIT (OUTPATIENT)
Dept: FAMILY MEDICINE CLINIC | Facility: CLINIC | Age: 54
End: 2024-09-11
Payer: COMMERCIAL

## 2024-09-11 VITALS
HEART RATE: 69 BPM | BODY MASS INDEX: 34.37 KG/M2 | TEMPERATURE: 98.6 F | HEIGHT: 68 IN | OXYGEN SATURATION: 99 % | WEIGHT: 226.8 LBS | DIASTOLIC BLOOD PRESSURE: 78 MMHG | SYSTOLIC BLOOD PRESSURE: 120 MMHG | RESPIRATION RATE: 17 BRPM

## 2024-09-11 DIAGNOSIS — Z00.00 PHYSICAL EXAM, ANNUAL: Primary | ICD-10-CM

## 2024-09-11 PROCEDURE — 99396 PREV VISIT EST AGE 40-64: CPT | Performed by: FAMILY MEDICINE

## 2024-09-11 PROCEDURE — 3074F SYST BP LT 130 MM HG: CPT | Performed by: FAMILY MEDICINE

## 2024-09-11 PROCEDURE — 3078F DIAST BP <80 MM HG: CPT | Performed by: FAMILY MEDICINE

## 2024-09-11 ASSESSMENT — ENCOUNTER SYMPTOMS
BACK PAIN: 0
EYE DISCHARGE: 0
COUGH: 0
ABDOMINAL DISTENTION: 0
WHEEZING: 0
SINUS PAIN: 0
ABDOMINAL PAIN: 0
SORE THROAT: 0
EYE ITCHING: 0
BLOOD IN STOOL: 0
DIARRHEA: 0
NAUSEA: 0
PHOTOPHOBIA: 0
EYE REDNESS: 0
SHORTNESS OF BREATH: 0
VOMITING: 0

## 2024-09-11 ASSESSMENT — PATIENT HEALTH QUESTIONNAIRE - PHQ9
1. LITTLE INTEREST OR PLEASURE IN DOING THINGS: NOT AT ALL
SUM OF ALL RESPONSES TO PHQ9 QUESTIONS 1 & 2: 0
SUM OF ALL RESPONSES TO PHQ QUESTIONS 1-9: 0
2. FEELING DOWN, DEPRESSED OR HOPELESS: NOT AT ALL
SUM OF ALL RESPONSES TO PHQ QUESTIONS 1-9: 0

## 2024-10-11 PROBLEM — Z00.00 PHYSICAL EXAM, ANNUAL: Status: RESOLVED | Noted: 2022-09-01 | Resolved: 2024-10-11

## 2024-11-12 NOTE — PROGRESS NOTES
6 months.  We would likely then move forward with repeating his MRI after that especially unless his PSA is down considerably.  He is okay with that approach.      PLAN:     -RTC in 6 months with PSA  ________________________________________      I have seen and examined this patient.  I have reviewed and edited the note started by the MA and agree with the outlined plan.  Part of this note was written by using a voice dictation software. The note has been proof read but may still contain some grammatical/other typographical errors.      Saroj Rios MD  Urologic Oncology  [facility]rology

## 2024-11-18 ENCOUNTER — OFFICE VISIT (OUTPATIENT)
Dept: ONCOLOGY | Age: 54
End: 2024-11-18
Payer: COMMERCIAL

## 2024-11-18 ENCOUNTER — HOSPITAL ENCOUNTER (OUTPATIENT)
Dept: LAB | Age: 54
Discharge: HOME | End: 2024-11-18
Payer: COMMERCIAL

## 2024-11-18 VITALS
RESPIRATION RATE: 16 BRPM | TEMPERATURE: 98.2 F | OXYGEN SATURATION: 98 % | DIASTOLIC BLOOD PRESSURE: 97 MMHG | HEART RATE: 59 BPM | HEIGHT: 68 IN | WEIGHT: 229.3 LBS | SYSTOLIC BLOOD PRESSURE: 138 MMHG | BODY MASS INDEX: 34.75 KG/M2

## 2024-11-18 DIAGNOSIS — R97.20 ELEVATED PSA: ICD-10-CM

## 2024-11-18 DIAGNOSIS — R97.20 ELEVATED PSA: Primary | ICD-10-CM

## 2024-11-18 LAB — PSA SERPL-MCNC: 4.6 NG/ML (ref 0–4)

## 2024-11-18 PROCEDURE — 36415 COLL VENOUS BLD VENIPUNCTURE: CPT

## 2024-11-18 PROCEDURE — 99213 OFFICE O/P EST LOW 20 MIN: CPT | Performed by: UROLOGY

## 2024-11-18 PROCEDURE — 3080F DIAST BP >= 90 MM HG: CPT | Performed by: UROLOGY

## 2024-11-18 PROCEDURE — 84153 ASSAY OF PSA TOTAL: CPT

## 2024-11-18 PROCEDURE — 3075F SYST BP GE 130 - 139MM HG: CPT | Performed by: UROLOGY

## 2024-11-18 NOTE — PATIENT INSTRUCTIONS
I would like for you to have your PSA checked 2-3 days prior to your return visit. You can go to any of the [facility] outreach labs below without an appointment to have this drawn.    Radiology should be calling you to schedule your MRI. Ideally this is done about 2 weeks before your return visit. If you do not hear from them you can call the number below to schedule: [phone].    [facility] Outreach Labs    [facility]: [address]  Phone: [phone] Hours: Mon - Fri 730am - 430pm    [facility]: [address] [facility] [address]  Phone: [phone] Hours: Mon - Fri 730am - 430pm    [facility]: [address]  Phone: [phone] Hours: Mon - Fri 700am - 430pm    [facility]: [address], [address]  Phone: [phone] Hours: Mon - Fri 730am - 430pm

## 2024-12-01 DIAGNOSIS — E87.6 HYPOKALEMIA: Chronic | ICD-10-CM

## 2024-12-01 DIAGNOSIS — J30.1 NON-SEASONAL ALLERGIC RHINITIS DUE TO POLLEN: Chronic | ICD-10-CM

## 2024-12-01 DIAGNOSIS — I10 HYPERTENSION, ESSENTIAL, BENIGN: Chronic | ICD-10-CM

## 2024-12-02 RX ORDER — BENAZEPRIL HYDROCHLORIDE 20 MG/1
20 TABLET ORAL DAILY
Qty: 90 TABLET | Refills: 1 | OUTPATIENT
Start: 2024-12-02

## 2024-12-02 RX ORDER — CETIRIZINE HYDROCHLORIDE 10 MG/1
10 TABLET ORAL DAILY PRN
Qty: 90 TABLET | Refills: 1 | OUTPATIENT
Start: 2024-12-02

## 2024-12-02 RX ORDER — POTASSIUM CHLORIDE 750 MG/1
10 TABLET, EXTENDED RELEASE ORAL DAILY
Qty: 90 TABLET | Refills: 1 | OUTPATIENT
Start: 2024-12-02

## 2024-12-04 ENCOUNTER — OFFICE VISIT (OUTPATIENT)
Dept: FAMILY MEDICINE CLINIC | Facility: CLINIC | Age: 54
End: 2024-12-04
Payer: COMMERCIAL

## 2024-12-04 VITALS
TEMPERATURE: 97.6 F | OXYGEN SATURATION: 99 % | SYSTOLIC BLOOD PRESSURE: 128 MMHG | DIASTOLIC BLOOD PRESSURE: 84 MMHG | RESPIRATION RATE: 16 BRPM | BODY MASS INDEX: 34.75 KG/M2 | WEIGHT: 229.3 LBS | HEIGHT: 68 IN | HEART RATE: 67 BPM

## 2024-12-04 DIAGNOSIS — E66.811 CLASS 1 OBESITY DUE TO EXCESS CALORIES WITH SERIOUS COMORBIDITY AND BODY MASS INDEX (BMI) OF 34.0 TO 34.9 IN ADULT: Chronic | ICD-10-CM

## 2024-12-04 DIAGNOSIS — E78.2 MIXED HYPERLIPIDEMIA: Primary | Chronic | ICD-10-CM

## 2024-12-04 DIAGNOSIS — J30.1 NON-SEASONAL ALLERGIC RHINITIS DUE TO POLLEN: Chronic | ICD-10-CM

## 2024-12-04 DIAGNOSIS — E66.09 CLASS 1 OBESITY DUE TO EXCESS CALORIES WITH SERIOUS COMORBIDITY AND BODY MASS INDEX (BMI) OF 34.0 TO 34.9 IN ADULT: Chronic | ICD-10-CM

## 2024-12-04 DIAGNOSIS — R73.03 PREDIABETES: Chronic | ICD-10-CM

## 2024-12-04 DIAGNOSIS — E78.2 MIXED HYPERLIPIDEMIA: Chronic | ICD-10-CM

## 2024-12-04 DIAGNOSIS — E87.6 HYPOKALEMIA: Chronic | ICD-10-CM

## 2024-12-04 DIAGNOSIS — I10 HYPERTENSION, ESSENTIAL, BENIGN: Chronic | ICD-10-CM

## 2024-12-04 LAB
ALBUMIN SERPL-MCNC: 4.3 G/DL (ref 3.5–5)
ALBUMIN/GLOB SERPL: 1.3 (ref 1–1.9)
ALP SERPL-CCNC: 55 U/L (ref 40–129)
ALT SERPL-CCNC: 36 U/L (ref 8–55)
ANION GAP SERPL CALC-SCNC: 12 MMOL/L (ref 7–16)
AST SERPL-CCNC: 36 U/L (ref 15–37)
BASOPHILS # BLD: 0.1 K/UL (ref 0–0.2)
BASOPHILS NFR BLD: 2 % (ref 0–2)
BILIRUB SERPL-MCNC: 0.6 MG/DL (ref 0–1.2)
BUN SERPL-MCNC: 9 MG/DL (ref 6–23)
CALCIUM SERPL-MCNC: 9.6 MG/DL (ref 8.8–10.2)
CHLORIDE SERPL-SCNC: 103 MMOL/L (ref 98–107)
CHOLEST SERPL-MCNC: 156 MG/DL (ref 0–200)
CO2 SERPL-SCNC: 27 MMOL/L (ref 20–29)
CREAT SERPL-MCNC: 1.25 MG/DL (ref 0.8–1.3)
DIFFERENTIAL METHOD BLD: ABNORMAL
EOSINOPHIL # BLD: 0.1 K/UL (ref 0–0.8)
EOSINOPHIL NFR BLD: 2 % (ref 0.5–7.8)
ERYTHROCYTE [DISTWIDTH] IN BLOOD BY AUTOMATED COUNT: 12.6 % (ref 11.9–14.6)
EST. AVERAGE GLUCOSE BLD GHB EST-MCNC: 123 MG/DL
GLOBULIN SER CALC-MCNC: 3.3 G/DL (ref 2.3–3.5)
GLUCOSE SERPL-MCNC: 94 MG/DL (ref 70–99)
HBA1C MFR BLD: 5.9 % (ref 0–5.6)
HCT VFR BLD AUTO: 53.4 % (ref 41.1–50.3)
HDLC SERPL-MCNC: 67 MG/DL (ref 40–60)
HDLC SERPL: 2.3 (ref 0–5)
HGB BLD-MCNC: 17.5 G/DL (ref 13.6–17.2)
IMM GRANULOCYTES # BLD AUTO: 0 K/UL (ref 0–0.5)
IMM GRANULOCYTES NFR BLD AUTO: 0 % (ref 0–5)
LDLC SERPL CALC-MCNC: 81 MG/DL (ref 0–100)
LYMPHOCYTES # BLD: 1.7 K/UL (ref 0.5–4.6)
LYMPHOCYTES NFR BLD: 41 % (ref 13–44)
MCH RBC QN AUTO: 28.8 PG (ref 26.1–32.9)
MCHC RBC AUTO-ENTMCNC: 32.8 G/DL (ref 31.4–35)
MCV RBC AUTO: 87.8 FL (ref 82–102)
MONOCYTES # BLD: 0.3 K/UL (ref 0.1–1.3)
MONOCYTES NFR BLD: 6 % (ref 4–12)
NEUTS SEG # BLD: 2 K/UL (ref 1.7–8.2)
NEUTS SEG NFR BLD: 49 % (ref 43–78)
NRBC # BLD: 0 K/UL (ref 0–0.2)
PLATELET # BLD AUTO: 203 K/UL (ref 150–450)
PMV BLD AUTO: 12.3 FL (ref 9.4–12.3)
POTASSIUM SERPL-SCNC: 4.1 MMOL/L (ref 3.5–5.1)
PROT SERPL-MCNC: 7.6 G/DL (ref 6.3–8.2)
RBC # BLD AUTO: 6.08 M/UL (ref 4.23–5.6)
SODIUM SERPL-SCNC: 142 MMOL/L (ref 136–145)
TRIGL SERPL-MCNC: 38 MG/DL (ref 0–150)
VLDLC SERPL CALC-MCNC: 8 MG/DL (ref 6–23)
WBC # BLD AUTO: 4.1 K/UL (ref 4.3–11.1)

## 2024-12-04 PROCEDURE — 99214 OFFICE O/P EST MOD 30 MIN: CPT | Performed by: FAMILY MEDICINE

## 2024-12-04 PROCEDURE — 3074F SYST BP LT 130 MM HG: CPT | Performed by: FAMILY MEDICINE

## 2024-12-04 PROCEDURE — 3079F DIAST BP 80-89 MM HG: CPT | Performed by: FAMILY MEDICINE

## 2024-12-04 RX ORDER — ATORVASTATIN CALCIUM 10 MG/1
10 TABLET, FILM COATED ORAL NIGHTLY
Qty: 90 TABLET | Refills: 1 | Status: SHIPPED | OUTPATIENT
Start: 2024-12-04

## 2024-12-04 RX ORDER — BENAZEPRIL HYDROCHLORIDE 20 MG/1
20 TABLET ORAL DAILY
Qty: 90 TABLET | Refills: 1 | Status: SHIPPED | OUTPATIENT
Start: 2024-12-04

## 2024-12-04 RX ORDER — POTASSIUM CHLORIDE 750 MG/1
10 TABLET, EXTENDED RELEASE ORAL DAILY
Qty: 90 TABLET | Refills: 1 | Status: SHIPPED | OUTPATIENT
Start: 2024-12-04

## 2024-12-04 RX ORDER — FLUTICASONE PROPIONATE 50 MCG
2 SPRAY, SUSPENSION (ML) NASAL DAILY PRN
Qty: 16 EACH | Refills: 2 | Status: SHIPPED | OUTPATIENT
Start: 2024-12-04

## 2024-12-04 RX ORDER — CETIRIZINE HYDROCHLORIDE 10 MG/1
10 TABLET ORAL DAILY PRN
Qty: 90 TABLET | Refills: 1 | Status: CANCELLED | OUTPATIENT
Start: 2024-12-04

## 2024-12-04 ASSESSMENT — PATIENT HEALTH QUESTIONNAIRE - PHQ9
SUM OF ALL RESPONSES TO PHQ9 QUESTIONS 1 & 2: 0
SUM OF ALL RESPONSES TO PHQ QUESTIONS 1-9: 0
2. FEELING DOWN, DEPRESSED OR HOPELESS: NOT AT ALL
SUM OF ALL RESPONSES TO PHQ QUESTIONS 1-9: 0
1. LITTLE INTEREST OR PLEASURE IN DOING THINGS: NOT AT ALL
SUM OF ALL RESPONSES TO PHQ QUESTIONS 1-9: 0
SUM OF ALL RESPONSES TO PHQ QUESTIONS 1-9: 0

## 2024-12-04 ASSESSMENT — ENCOUNTER SYMPTOMS
SORE THROAT: 0
SINUS PRESSURE: 0
SINUS PAIN: 0
PHOTOPHOBIA: 0
VOMITING: 0
DIARRHEA: 0
NAUSEA: 0
WHEEZING: 0
COUGH: 0
SHORTNESS OF BREATH: 0
ABDOMINAL PAIN: 0
VOICE CHANGE: 0

## 2024-12-04 NOTE — PROGRESS NOTES
Pio Rodriguez (:  1970) is a 54 y.o. male,Established patient, here for evaluation of the following chief complaint(s):  Medication Refill and Labs Only      Assessment & Plan   ASSESSMENT/PLAN:  1. Mixed hyperlipidemia  -     atorvastatin (LIPITOR) 10 MG tablet; Take 1 tablet by mouth at bedtime, Disp-90 tablet, R-1Normal  -     Comprehensive Metabolic Panel; Future  -     Lipid Panel; Future  2. Hypertension, essential, benign  -     benazepril (LOTENSIN) 20 MG tablet; Take 1 tablet by mouth daily TAKE 1 TAB BY MOUTH DAILY., Disp-90 tablet, R-1Normal  -     CBC with Auto Differential; Future  -     Comprehensive Metabolic Panel; Future  3. Non-seasonal allergic rhinitis due to pollen  -     fluticasone (FLONASE) 50 MCG/ACT nasal spray; 2 sprays by Nasal route daily as needed for Allergies (prn), Disp-16 each, R-2Normal  4. Hypokalemia  -     potassium chloride (KLOR-CON M) 10 MEQ extended release tablet; Take 1 tablet by mouth daily, Disp-90 tablet, R-1Normal  -     Comprehensive Metabolic Panel; Future  5. Prediabetes  -     Hemoglobin A1C; Future  6. Class 1 obesity due to excess calories with serious comorbidity and body mass index (BMI) of 34.0 to 34.9 in adult    Prediabetes- slightly better, await labs, to minimize carbohydrates and sweets.  Hypokalemia- well controlled and stable; continue current management.  HLP- well controlled, continue current management, await labs, to avoid fatty foods.  HTN- well controlled, continue current management.  Allergic rhinitis- stable; continue current management.  Obesity- chronic, slightly worse. Strongly advised patient to lose weight.  Also advised to exercise regularly, to eat healthy foods, and to control portion sizes.    Return in about 6 months (around 2025) for medication refills, fasting labs or prn sooner.         Subjective   SUBJECTIVE/OBJECTIVE:  Medication Refill  Pertinent negatives include no abdominal pain, chest pain, chills,

## 2025-03-05 DIAGNOSIS — E78.2 MIXED HYPERLIPIDEMIA: Chronic | ICD-10-CM

## 2025-03-05 DIAGNOSIS — E87.6 HYPOKALEMIA: Chronic | ICD-10-CM

## 2025-03-05 DIAGNOSIS — I10 HYPERTENSION, ESSENTIAL, BENIGN: Chronic | ICD-10-CM

## 2025-03-07 RX ORDER — POTASSIUM CHLORIDE 750 MG/1
10 TABLET, EXTENDED RELEASE ORAL DAILY
Qty: 90 TABLET | Refills: 1 | OUTPATIENT
Start: 2025-03-07

## 2025-03-07 RX ORDER — ATORVASTATIN CALCIUM 10 MG/1
10 TABLET, FILM COATED ORAL
Qty: 90 TABLET | Refills: 1 | OUTPATIENT
Start: 2025-03-07

## 2025-03-07 RX ORDER — BENAZEPRIL HYDROCHLORIDE 20 MG/1
20 TABLET ORAL DAILY
Qty: 90 TABLET | Refills: 1 | OUTPATIENT
Start: 2025-03-07

## 2025-05-13 DIAGNOSIS — R97.20 ELEVATED PSA: ICD-10-CM

## 2025-05-13 LAB — PSA SERPL-MCNC: 3.9 NG/ML (ref 0–4)

## 2025-05-20 ASSESSMENT — SLEEP AND FATIGUE QUESTIONNAIRES
ESS TOTAL SCORE: 5
HOW LIKELY ARE YOU TO NOD OFF OR FALL ASLEEP WHILE SITTING INACTIVE IN A PUBLIC PLACE: WOULD NEVER DOZE
HOW LIKELY ARE YOU TO NOD OFF OR FALL ASLEEP IN A CAR, WHILE STOPPED FOR A FEW MINUTES IN TRAFFIC: WOULD NEVER DOZE
HOW LIKELY ARE YOU TO NOD OFF OR FALL ASLEEP WHILE SITTING AND TALKING TO SOMEONE: WOULD NEVER DOZE
HOW LIKELY ARE YOU TO NOD OFF OR FALL ASLEEP WHILE LYING DOWN TO REST IN THE AFTERNOON WHEN CIRCUMSTANCES PERMIT: HIGH CHANCE OF DOZING
HOW LIKELY ARE YOU TO NOD OFF OR FALL ASLEEP WHEN YOU ARE A PASSENGER IN A CAR FOR AN HOUR WITHOUT A BREAK: SLIGHT CHANCE OF DOZING
HOW LIKELY ARE YOU TO NOD OFF OR FALL ASLEEP WHILE SITTING QUIETLY AFTER LUNCH WITHOUT ALCOHOL: WOULD NEVER DOZE
HOW LIKELY ARE YOU TO NOD OFF OR FALL ASLEEP IN A CAR, WHILE STOPPED FOR A FEW MINUTES IN TRAFFIC: WOULD NEVER DOZE
HOW LIKELY ARE YOU TO NOD OFF OR FALL ASLEEP WHILE SITTING QUIETLY AFTER LUNCH WITHOUT ALCOHOL: WOULD NEVER DOZE
HOW LIKELY ARE YOU TO NOD OFF OR FALL ASLEEP WHEN YOU ARE A PASSENGER IN A CAR FOR AN HOUR WITHOUT A BREAK: SLIGHT CHANCE OF DOZING
HOW LIKELY ARE YOU TO NOD OFF OR FALL ASLEEP WHILE LYING DOWN TO REST IN THE AFTERNOON WHEN CIRCUMSTANCES PERMIT: HIGH CHANCE OF DOZING
HOW LIKELY ARE YOU TO NOD OFF OR FALL ASLEEP WHILE SITTING AND READING: WOULD NEVER DOZE
HOW LIKELY ARE YOU TO NOD OFF OR FALL ASLEEP WHILE SITTING AND TALKING TO SOMEONE: WOULD NEVER DOZE
HOW LIKELY ARE YOU TO NOD OFF OR FALL ASLEEP WHILE WATCHING TV: SLIGHT CHANCE OF DOZING
HOW LIKELY ARE YOU TO NOD OFF OR FALL ASLEEP WHILE SITTING AND READING: WOULD NEVER DOZE
HOW LIKELY ARE YOU TO NOD OFF OR FALL ASLEEP WHILE SITTING INACTIVE IN A PUBLIC PLACE: WOULD NEVER DOZE
HOW LIKELY ARE YOU TO NOD OFF OR FALL ASLEEP WHILE WATCHING TV: SLIGHT CHANCE OF DOZING

## 2025-05-21 ENCOUNTER — OFFICE VISIT (OUTPATIENT)
Dept: SLEEP MEDICINE | Age: 55
End: 2025-05-21
Payer: COMMERCIAL

## 2025-05-21 VITALS
DIASTOLIC BLOOD PRESSURE: 84 MMHG | RESPIRATION RATE: 18 BRPM | BODY MASS INDEX: 35.16 KG/M2 | HEIGHT: 68 IN | SYSTOLIC BLOOD PRESSURE: 134 MMHG | WEIGHT: 232 LBS | OXYGEN SATURATION: 97 % | HEART RATE: 54 BPM

## 2025-05-21 DIAGNOSIS — G47.33 OSA (OBSTRUCTIVE SLEEP APNEA): Primary | ICD-10-CM

## 2025-05-21 DIAGNOSIS — E66.9 OBESITY (BMI 35.0-39.9 WITHOUT COMORBIDITY): ICD-10-CM

## 2025-05-21 DIAGNOSIS — G47.00 PERSISTENT DISORDER OF INITIATING OR MAINTAINING SLEEP: ICD-10-CM

## 2025-05-21 DIAGNOSIS — G47.34 NOCTURNAL HYPOXEMIA: ICD-10-CM

## 2025-05-21 PROCEDURE — 3075F SYST BP GE 130 - 139MM HG: CPT | Performed by: NURSE PRACTITIONER

## 2025-05-21 PROCEDURE — 99213 OFFICE O/P EST LOW 20 MIN: CPT | Performed by: NURSE PRACTITIONER

## 2025-05-21 PROCEDURE — 3079F DIAST BP 80-89 MM HG: CPT | Performed by: NURSE PRACTITIONER

## 2025-05-21 ASSESSMENT — ENCOUNTER SYMPTOMS
SHORTNESS OF BREATH: 0
VOICE CHANGE: 0
APNEA: 0
SORE THROAT: 0

## 2025-05-21 NOTE — PATIENT INSTRUCTIONS
up feeling more refreshed.  Your sleeping area  Use your bedroom only for sleeping and sex. A bit of light reading may help you fall asleep. But if it doesn't, do your reading elsewhere in the house. Don't watch TV in bed.  Be sure your bed is big enough to stretch out comfortably, especially if you have a sleep partner.  Keep your bedroom quiet, dark, and cool. Use curtains, blinds, or a sleep mask to block out light. To block out noise, use earplugs, soothing music, or a \"white noise\" machine.  Your evening and bedtime routine  Create a relaxing bedtime routine. You might want to take a warm shower or bath, listen to soothing music, or drink a cup of noncaffeinated tea.  Go to bed at the same time every night. And get up at the same time every morning, even if you feel tired.  What to avoid  Limit caffeine (coffee, tea, caffeinated sodas) during the day, and don't have any for at least 4 to 6 hours before bedtime.  Don't drink alcohol before bedtime. Alcohol can cause you to wake up more often during the night.  Don't smoke or use tobacco, especially in the evening. Nicotine can keep you awake.  Don't take naps during the day, especially close to bedtime.  Don't lie in bed awake for too long. If you can't fall asleep, or if you wake up in the middle of the night and can't get back to sleep within 15 minutes or so, get out of bed and go to another room until you feel sleepy.  Don't take medicine right before bed that may keep you awake or make you feel hyper or energized. Your doctor can tell you if your medicine may do this and if you can take it earlier in the day.  If you can't sleep  Imagine yourself in a peaceful, pleasant scene. Focus on the details and feelings of being in a place that is relaxing.  Get up and do a quiet or boring activity until you feel sleepy.  Don't drink any liquids after 6 p.m. if you wake up often because you have to go to the bathroom.  Where can you learn more?  Go to

## 2025-05-21 NOTE — PROGRESS NOTES
[facility]  [address]  [place] [address]  [phone]    Patient Name:  Pio Rodriguez  YOB: 1970      Office Visit 5/21/2025    CHIEF COMPLAINT:    Chief Complaint   Patient presents with    Sleep Apnea         HISTORY OF PRESENT ILLNESS:  Patient is a 55 y.o. male seen today for follow up of QUIRINO.   Diagnostic PSG on 11/20/2022 with AHI of 46.5 with O2 parth of 81%.  Patient is prescribed cpap therapy with a humidifier set at 13-17 cm with a FF (F20) mask. The usage days greater than four hours is 98/365 (27%).  The hourly usage is 5 hours 21 minutes nightly.  Most recent download reveals AHI on PAP therapy is 5.1, leak is 69.2 L/min at the 95th percentile.   This is the best AHI the patient has had in the past 2 years.  He reports that the machine is working well and the pressures feel comfortable.  He does experience a significant mask leak.  He has not been changing out his mask cushions as frequently as he should.  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  He states he gets in bed by about 12 AM and reports sleep onset to be within 10 minutes.  He wakes up at 4:45 AM with an alarm to get his day started.  He is no longer having any issues with initiating or maintaining sleep, as he attributes his busy work life and commitments to his inability to get a full night of sleep.  He reports that he does take naps occasionally and he rarely uses his CPAP machine.  He knows he should be using CPAP more often to help support his overall health, and he states he will continue to work on increased compliance in the months moving forward.  [place] is 5/24 today.  Patient denies nocturia or swelling in his legs.  His blood pressure is well-controlled on medications and his weight has fluctuated a bit.  He

## 2025-05-23 NOTE — PATIENT INSTRUCTIONS
Patient Information from Today's Visit    The members of your [facility] are listed below:    Physician Provider: Singh Rios, Urologic Oncologist  Advanced Practice Clinician: EDITH Stearns  Nurse Navigator: Tiffanie LEMUS RN  Medical Assistant: Syeda BETTS MA  :Christina LEMUS  Supportive Care Services: Neel GALLEGOS LMSW    Diagnosis: Elevated PSA    Follow Up Instructions:    Follow up office visit today  We will recommend a MRI in 6 months and follow up with us after to go over results.   *MRI prior to office visit - you can call the following number to schedule this if you do not hear from them within 2-3 weeks of your appointment.    You can call to schedule this at [phone].   [facility] Outreach Labs    [facility]: [address]  Phone: [phone] Hours: Mon - Fri 730am - 430pm    [facility]: [address]  Phone: [phone] Hours: Mon - Fri 730am - 430pm    [facility]: [address]  Phone: [phone] Hours: Mon - Fri 700am - 430pm    [facility]: [address] [facility]  Phone: [phone] Hours: Mon - Fri 730am - 430pm     [facility]  [address] [facility] [address]  Phone [phone], Fax [phone]  Mon.-Fri. 7:30 a.m. - 4:30 p.m       Current Labs:    Results for orders placed or performed in visit on 05/13/25   PSA, Diagnostic   Result Value Ref Range    PSA 3.9 0.0 - 4.0 ng/mL          Please refer to After Visit Summary or [employer] for upcoming appointment information. Please call our office for rescheduling needs at least 24 hours before your scheduled appointment time.If you have any questions regarding your upcoming schedule please reach out to your care team through [employer] or call [phone].     Please notify your assigned Nurse Navigator of any unplanned hospital admissions or Emergency Room

## 2025-05-28 ENCOUNTER — OFFICE VISIT (OUTPATIENT)
Age: 55
End: 2025-05-28
Payer: COMMERCIAL

## 2025-05-28 VITALS — WEIGHT: 233 LBS | BODY MASS INDEX: 35.31 KG/M2 | RESPIRATION RATE: 18 BRPM | HEIGHT: 68 IN

## 2025-05-28 DIAGNOSIS — R97.20 ELEVATED PSA: Primary | ICD-10-CM

## 2025-05-28 PROCEDURE — 99213 OFFICE O/P EST LOW 20 MIN: CPT | Performed by: PHYSICIAN ASSISTANT

## 2025-05-28 NOTE — PROGRESS NOTES
Urologic Oncology  [facility]  [address] [place] [id number]  [phone]        Mr. Pio Rodriguez is a 55 y.o. male with a diagnosis of elevated PSA, s/p trus fusion prostate bx on 4/13/2023, NEG.     INTERVAL HISTORY:    Patient returns today for follow-up evaluation of elevated PSA with history of negative prostate biopsy in April 2023.    Patient is been doing well since last visit.  Is without acute complaints.  No significant lower urinary tract symptoms including urinary frequency, urgency, dysuria or hematuria.    Patient's PSA on 5/13/2025 was down to 3.9, previously 4.6 on 11/18/2024 and as high as 6.8 on 11/6/2023.    Patient has an extensive family history of prostate cancer.    From previous note (11/18/24):  Patient here today for follow-up of his elevated PSA.  He has a history of PSA being as high as 6.4 in November 2023.  He had an MRI of his prostate that led to a prostate biopsy on 4/13/2023.  This biopsy was negative for malignancy.  Repeat PSA on 5/6/2024 was 4.5.  Today his PSA is stable at 4.6.  He has no complaints.  Denies any hematuria or dysuria.     His prostate measures 59 cc and his PSA density is 0.11.  Of note his MRI showed 2 PI-RADS 3 lesions that were targeted and were negative.  His digital rectal exam was also unremarkable.  He denies any hematuria or dysuria.    Past medical, family and social histories, as well as medications and allergies, were reviewed and updated in the medical record as appropriate.    PMH:     Past Medical History:   Diagnosis Date    Dyslipidemia 5/8/2014    Elevated PSA     Family history of prostate cancer 5/8/2014    Fracture of right wrist 1979    HTN (hypertension) 5/8/2014    controlled with med    Hyperlipidemia     QUIRINO (obstructive sleep apnea) 5/8/2014    wears cpap at hs    Prolonged emergence from general anesthesia 2018    with colonscopy--per pt-- no issues with last colonscopy 1/2023       MEDs:

## 2025-06-02 SDOH — ECONOMIC STABILITY: FOOD INSECURITY: WITHIN THE PAST 12 MONTHS, YOU WORRIED THAT YOUR FOOD WOULD RUN OUT BEFORE YOU GOT MONEY TO BUY MORE.: NEVER TRUE

## 2025-06-02 SDOH — ECONOMIC STABILITY: TRANSPORTATION INSECURITY
IN THE PAST 12 MONTHS, HAS THE LACK OF TRANSPORTATION KEPT YOU FROM MEDICAL APPOINTMENTS OR FROM GETTING MEDICATIONS?: NO

## 2025-06-02 SDOH — ECONOMIC STABILITY: INCOME INSECURITY: IN THE LAST 12 MONTHS, WAS THERE A TIME WHEN YOU WERE NOT ABLE TO PAY THE MORTGAGE OR RENT ON TIME?: NO

## 2025-06-02 SDOH — ECONOMIC STABILITY: FOOD INSECURITY: WITHIN THE PAST 12 MONTHS, THE FOOD YOU BOUGHT JUST DIDN'T LAST AND YOU DIDN'T HAVE MONEY TO GET MORE.: NEVER TRUE

## 2025-06-02 ASSESSMENT — PATIENT HEALTH QUESTIONNAIRE - PHQ9
1. LITTLE INTEREST OR PLEASURE IN DOING THINGS: NOT AT ALL
SUM OF ALL RESPONSES TO PHQ QUESTIONS 1-9: 0
2. FEELING DOWN, DEPRESSED OR HOPELESS: NOT AT ALL
2. FEELING DOWN, DEPRESSED OR HOPELESS: NOT AT ALL
1. LITTLE INTEREST OR PLEASURE IN DOING THINGS: NOT AT ALL
SUM OF ALL RESPONSES TO PHQ QUESTIONS 1-9: 0
SUM OF ALL RESPONSES TO PHQ9 QUESTIONS 1 & 2: 0

## 2025-06-04 ENCOUNTER — OFFICE VISIT (OUTPATIENT)
Dept: FAMILY MEDICINE CLINIC | Facility: CLINIC | Age: 55
End: 2025-06-04
Payer: COMMERCIAL

## 2025-06-04 VITALS
HEIGHT: 68 IN | TEMPERATURE: 97.5 F | BODY MASS INDEX: 35.03 KG/M2 | WEIGHT: 231.1 LBS | SYSTOLIC BLOOD PRESSURE: 118 MMHG | OXYGEN SATURATION: 99 % | DIASTOLIC BLOOD PRESSURE: 86 MMHG | HEART RATE: 50 BPM | RESPIRATION RATE: 16 BRPM

## 2025-06-04 DIAGNOSIS — R73.03 PREDIABETES: ICD-10-CM

## 2025-06-04 DIAGNOSIS — E78.2 MIXED HYPERLIPIDEMIA: Primary | Chronic | ICD-10-CM

## 2025-06-04 DIAGNOSIS — E78.2 MIXED HYPERLIPIDEMIA: Chronic | ICD-10-CM

## 2025-06-04 DIAGNOSIS — E87.6 HYPOKALEMIA: Chronic | ICD-10-CM

## 2025-06-04 DIAGNOSIS — E66.812 CLASS 2 SEVERE OBESITY DUE TO EXCESS CALORIES WITH SERIOUS COMORBIDITY AND BODY MASS INDEX (BMI) OF 35.0 TO 35.9 IN ADULT (HCC): ICD-10-CM

## 2025-06-04 DIAGNOSIS — I10 HYPERTENSION, ESSENTIAL, BENIGN: Chronic | ICD-10-CM

## 2025-06-04 DIAGNOSIS — E66.01 CLASS 2 SEVERE OBESITY DUE TO EXCESS CALORIES WITH SERIOUS COMORBIDITY AND BODY MASS INDEX (BMI) OF 35.0 TO 35.9 IN ADULT (HCC): ICD-10-CM

## 2025-06-04 DIAGNOSIS — J30.1 NON-SEASONAL ALLERGIC RHINITIS DUE TO POLLEN: Chronic | ICD-10-CM

## 2025-06-04 LAB
ALBUMIN SERPL-MCNC: 4.1 G/DL (ref 3.5–5)
ALBUMIN/GLOB SERPL: 1.2 (ref 1–1.9)
ALP SERPL-CCNC: 51 U/L (ref 40–129)
ALT SERPL-CCNC: 38 U/L (ref 8–55)
ANION GAP SERPL CALC-SCNC: 13 MMOL/L (ref 7–16)
AST SERPL-CCNC: 52 U/L (ref 15–37)
BASOPHILS # BLD: 0.07 K/UL (ref 0–0.2)
BASOPHILS NFR BLD: 2 % (ref 0–2)
BILIRUB SERPL-MCNC: 0.7 MG/DL (ref 0–1.2)
BUN SERPL-MCNC: 9 MG/DL (ref 6–23)
CALCIUM SERPL-MCNC: 9.5 MG/DL (ref 8.8–10.2)
CHLORIDE SERPL-SCNC: 103 MMOL/L (ref 98–107)
CHOLEST SERPL-MCNC: 142 MG/DL (ref 0–200)
CO2 SERPL-SCNC: 24 MMOL/L (ref 20–29)
CREAT SERPL-MCNC: 1.29 MG/DL (ref 0.8–1.3)
DIFFERENTIAL METHOD BLD: ABNORMAL
EOSINOPHIL # BLD: 0.14 K/UL (ref 0–0.8)
EOSINOPHIL NFR BLD: 4.1 % (ref 0.5–7.8)
ERYTHROCYTE [DISTWIDTH] IN BLOOD BY AUTOMATED COUNT: 12.6 % (ref 11.9–14.6)
EST. AVERAGE GLUCOSE BLD GHB EST-MCNC: 113 MG/DL
GLOBULIN SER CALC-MCNC: 3.3 G/DL (ref 2.3–3.5)
GLUCOSE SERPL-MCNC: 89 MG/DL (ref 70–99)
HBA1C MFR BLD: 5.6 % (ref 0–5.6)
HCT VFR BLD AUTO: 51.8 % (ref 41.1–50.3)
HDLC SERPL-MCNC: 51 MG/DL (ref 40–60)
HDLC SERPL: 2.8 (ref 0–5)
HGB BLD-MCNC: 16.9 G/DL (ref 13.6–17.2)
IMM GRANULOCYTES # BLD AUTO: 0 K/UL (ref 0–0.5)
IMM GRANULOCYTES NFR BLD AUTO: 0 % (ref 0–5)
LDLC SERPL CALC-MCNC: 81 MG/DL (ref 0–100)
LYMPHOCYTES # BLD: 1.56 K/UL (ref 0.5–4.6)
LYMPHOCYTES NFR BLD: 45.2 % (ref 13–44)
MCH RBC QN AUTO: 28.7 PG (ref 26.1–32.9)
MCHC RBC AUTO-ENTMCNC: 32.6 G/DL (ref 31.4–35)
MCV RBC AUTO: 88.1 FL (ref 82–102)
MONOCYTES # BLD: 0.23 K/UL (ref 0.1–1.3)
MONOCYTES NFR BLD: 6.7 % (ref 4–12)
NEUTS SEG # BLD: 1.45 K/UL (ref 1.7–8.2)
NEUTS SEG NFR BLD: 42 % (ref 43–78)
NRBC # BLD: 0 K/UL (ref 0–0.2)
PLATELET # BLD AUTO: 198 K/UL (ref 150–450)
PMV BLD AUTO: 12.4 FL (ref 9.4–12.3)
POTASSIUM SERPL-SCNC: 4.8 MMOL/L (ref 3.5–5.1)
PROT SERPL-MCNC: 7.3 G/DL (ref 6.3–8.2)
RBC # BLD AUTO: 5.88 M/UL (ref 4.23–5.6)
SODIUM SERPL-SCNC: 140 MMOL/L (ref 136–145)
TRIGL SERPL-MCNC: 49 MG/DL (ref 0–150)
VLDLC SERPL CALC-MCNC: 10 MG/DL (ref 6–23)
WBC # BLD AUTO: 3.5 K/UL (ref 4.3–11.1)

## 2025-06-04 PROCEDURE — 99214 OFFICE O/P EST MOD 30 MIN: CPT | Performed by: FAMILY MEDICINE

## 2025-06-04 PROCEDURE — 3079F DIAST BP 80-89 MM HG: CPT | Performed by: FAMILY MEDICINE

## 2025-06-04 PROCEDURE — 3074F SYST BP LT 130 MM HG: CPT | Performed by: FAMILY MEDICINE

## 2025-06-04 RX ORDER — ATORVASTATIN CALCIUM 10 MG/1
10 TABLET, FILM COATED ORAL NIGHTLY
Qty: 90 TABLET | Refills: 1 | Status: SHIPPED | OUTPATIENT
Start: 2025-06-04

## 2025-06-04 RX ORDER — FLUTICASONE PROPIONATE 50 MCG
2 SPRAY, SUSPENSION (ML) NASAL DAILY PRN
Qty: 16 EACH | Refills: 2 | Status: SHIPPED | OUTPATIENT
Start: 2025-06-04

## 2025-06-04 RX ORDER — POTASSIUM CHLORIDE 750 MG/1
10 TABLET, EXTENDED RELEASE ORAL DAILY
Qty: 90 TABLET | Refills: 1 | Status: SHIPPED | OUTPATIENT
Start: 2025-06-04

## 2025-06-04 RX ORDER — BENAZEPRIL HYDROCHLORIDE 20 MG/1
20 TABLET ORAL DAILY
Qty: 90 TABLET | Refills: 1 | Status: SHIPPED | OUTPATIENT
Start: 2025-06-04

## 2025-06-04 NOTE — ASSESSMENT & PLAN NOTE
Chronic, at goal (stable), continue current treatment plan    Orders:    benazepril (LOTENSIN) 20 MG tablet; Take 1 tablet by mouth daily TAKE 1 TAB BY MOUTH DAILY.    CBC with Auto Differential; Future    Comprehensive Metabolic Panel; Future

## 2025-06-04 NOTE — PROGRESS NOTES
Melatonin 5 mg as needed- sleeping better. He sees EDITH Milner with Oncology; last PSA was normal at 3.9 on 5/13/25.     Hyperlipidemia, Obesity- he is up and down with prepping foods and eating out; careful with avoiding fatty foods overall. He is taking Atorvastatin 10 mg with dinner. His cholesterol labs have been normal. He is working out 3 times a week, about an hour or more each time, does cardio and weights. He has gained 2 lbs in the last 6 months and 11 lbs in the last 1 year; but had lost 39 lbs in the previous 6 months- says his weight was up to 265 lbs in the past. He works 10 hour days 5 days a week, cares for his Father and has other responsibilities.  Lab Results   Component Value Date    CHOL 156 12/04/2024    CHOL 141 06/05/2024    CHOL 139 12/05/2023     Lab Results   Component Value Date    TRIG 38 12/04/2024    TRIG 39 06/05/2024    TRIG 38 12/05/2023     Lab Results   Component Value Date    HDL 67 (H) 12/04/2024    HDL 60 06/05/2024    HDL 49 12/05/2023     Lab Results   Component Value Date    LDL 81 12/04/2024    LDL 73 06/05/2024    LDL 82.4 12/05/2023     Lab Results   Component Value Date    VLDL 8 12/04/2024    VLDL 8 06/05/2024    VLDL 7.6 12/05/2023     Lab Results   Component Value Date    CHOLHDLRATIO 2.3 12/04/2024    CHOLHDLRATIO 2.3 06/05/2024    CHOLHDLRATIO 2.8 12/05/2023     Hypertension- avoids salt in his diet, takes Benazepril 20mg in am. He had an eye appointment in August 2024 at [facility]-  seeing floaters but very very rarely. He does not check his BP typically but has been good at appointments.     Hypokalemia- his potassium has been normal in 2024; he is taking a MVI and Klor Con 10 meq daily. He stopped eating bananas, eats apples, oranges and other fruits; not good about vegetables consistently. He denies any associated symptoms.     Seasonal allergies; (h/o bee sting reaction) - He had been using Flonase NS at HS prn, takes Zyrtec prn at - has enough;

## 2025-06-04 NOTE — ASSESSMENT & PLAN NOTE
Chronic, at goal (stable), continue current treatment plan, await labs    Orders:    atorvastatin (LIPITOR) 10 MG tablet; Take 1 tablet by mouth at bedtime    Comprehensive Metabolic Panel; Future    Lipid Panel; Future

## 2025-06-04 NOTE — ASSESSMENT & PLAN NOTE
Chronic, at goal (stable), continue current treatment plan    Orders:    fluticasone (FLONASE) 50 MCG/ACT nasal spray; 2 sprays by Nasal route daily as needed for Allergies (prn)

## 2025-06-04 NOTE — ASSESSMENT & PLAN NOTE
Chronic, not at goal (unstable), to minimize carbohydrates and sweets and lifestyle modifications recommended    Orders:    Hemoglobin A1C; Future

## 2025-06-16 ENCOUNTER — RESULTS FOLLOW-UP (OUTPATIENT)
Dept: FAMILY MEDICINE CLINIC | Facility: CLINIC | Age: 55
End: 2025-06-16

## 2025-06-28 NOTE — PROGRESS NOTES
[address], [address] [place] [address] Rosemary Gould [facility] [place], [address]  [facility]  [phone]    Office Note   Helio Dsouza   MRN: [id number]     : 1970        HPI: Helio Dsouza is a 46 y.o. male who returns to the office on 2023, referred by Dr. Blaine Gonzalez from the ER following visit for rectal bleeding on 7/15/2023. He had developed some rectal itching, discomfort, and a lump 1 week prior. 3 days prior, he developed bleeding with bowel movements. He presented with persistent bleeding with continued rectal pain. He denied any straining, constipation, or firm stools. No diarrhea. He had tried witch hazel, steroid cream, lidocaine cream, and suppositories. He believed the lump has decreased in size, but continues to bleed. Denied prior history of hemorrhoids. He was found to have a small ulcerated external thrombosed hemorrhoid in the LL/LA region. He was given symptomatic topical treatment with steroid cream and had resolution of bleeding. I had performed his colonoscopy in January. I also performed his prior colonoscopies. On none of the colonoscopies did I identify any significant hemorrhoidal disease. On this visit he denies any continued symptoms. He probably did do some straining. He is trying to keep his stool soft now. We have him set up for surveillance colonoscopy in 5 years in . He presents to the office on 2023, following recent surveillance colonoscopy. He had previous adenomatous polyps including tubulovillous adenomas and a family history of colon cancer. He also has family history of colon polyps. 2 polyps were removed from the left side of the colon that both returned as tubular adenomas. Neither polyp was greater than 10 mm. His path was discussed and he was given a copy of the report with the date  on it to help serve as a reminder for a 5-year surveillance interval recommendation.   His prior
Yes

## (undated) DEVICE — SYR 5ML 1/5 GRAD LL NSAF LF --

## (undated) DEVICE — REM POLYHESIVE ADULT PATIENT RETURN ELECTRODE: Brand: VALLEYLAB

## (undated) DEVICE — SYR 3ML LL TIP 1/10ML GRAD --

## (undated) DEVICE — CANNULA NSL ORAL AD FOR CAPNOFLEX CO2 O2 AIRLFE

## (undated) DEVICE — NDL PRT INJ NSAF BLNT 18GX1.5 --

## (undated) DEVICE — NEEDLE SYR 18GA L1.5IN RED PLAS HUB S STL BLNT FILL W/O

## (undated) DEVICE — ELECTRODE PT RET AD L9FT HI MOIST COND ADH HYDRGEL CORDED

## (undated) DEVICE — KENDALL RADIOLUCENT FOAM MONITORING ELECTRODE RECTANGULAR SHAPE: Brand: KENDALL

## (undated) DEVICE — AIRLIFE™ OXYGEN TUBING 7 FEET (2.1 M) CRUSH RESISTANT OXYGEN TUBING, VINYL TIPPED: Brand: AIRLIFE™

## (undated) DEVICE — SNARE POLYP SM W13MMXL240CM SHTH DIA2.4MM OVL FLX DISP

## (undated) DEVICE — GAUZE,SPONGE,4"X4",12PLY,WOVEN,NS,LF: Brand: MEDLINE

## (undated) DEVICE — SINGLE PORT MANIFOLD: Brand: NEPTUNE 2

## (undated) DEVICE — DRAPE TWL SURG 16X26IN BLU ORB04] ALLCARE INC]

## (undated) DEVICE — SYRINGE MED 3ML CLR PLAS STD N CTRL LUERLOCK TIP DISP

## (undated) DEVICE — CONTAINER FORMALIN PREFILLED 10% NBF 60ML

## (undated) DEVICE — CONNECTOR TBNG OD5-7MM O2 END DISP

## (undated) DEVICE — TRAP SPEC POLYP REM STRNR CLN DSGN MAGNIFYING WIND DISP

## (undated) DEVICE — SYRINGE MED 10ML LUERLOCK TIP W/O SFTY DISP

## (undated) DEVICE — CONTAINER PREFIL FRMLN 40ML --

## (undated) DEVICE — LUBE JELLY FOIL PACK 1.4 OZ: Brand: MEDLINE INDUSTRIES, INC.

## (undated) DEVICE — MAX-CORE® DISPOSABLE CORE BIOPSY INSTRUMENT, 18G X 25CM: Brand: MAX-CORE